# Patient Record
Sex: MALE | Race: WHITE | NOT HISPANIC OR LATINO | Employment: OTHER | URBAN - METROPOLITAN AREA
[De-identification: names, ages, dates, MRNs, and addresses within clinical notes are randomized per-mention and may not be internally consistent; named-entity substitution may affect disease eponyms.]

---

## 2017-01-11 ENCOUNTER — TELEPHONE (OUTPATIENT)
Dept: SLEEP MEDICINE | Facility: CLINIC | Age: 82
End: 2017-01-11

## 2017-01-11 DIAGNOSIS — G47.9 SLEEP DISTURBANCE: ICD-10-CM

## 2017-01-11 RX ORDER — CLONAZEPAM 0.5 MG/1
TABLET ORAL
Qty: 60 TABLET | Refills: 5 | Status: SHIPPED | OUTPATIENT
Start: 2017-01-11 | End: 2017-07-03 | Stop reason: SDUPTHER

## 2017-01-11 NOTE — TELEPHONE ENCOUNTER
----- Message from Randi Ojeda sent at 1/10/2017  9:23 AM CST -----  Contact: TONIA CAREY [[id number]]  _x  1st Request  _  2nd Request  _  3rd Request    Please refill the medication(s) listed below. Please call the patient when the prescription(s) is ready for  at the phone number [phone].    Medication #1 clonazePAM (KLONOPIN) 0.5 MG tablet        Preferred Pharmacy:  [facility] [address] [address]  Phone: [phone] Fax: [phone]

## 2017-01-16 DIAGNOSIS — R00.1 BRADYCARDIA: ICD-10-CM

## 2017-01-17 RX ORDER — LEVOTHYROXINE SODIUM 100 UG/1
TABLET ORAL
Qty: 90 TABLET | Refills: 0 | OUTPATIENT
Start: 2017-01-17

## 2017-02-15 ENCOUNTER — TELEPHONE (OUTPATIENT)
Dept: INFECTIOUS DISEASES | Facility: CLINIC | Age: 82
End: 2017-02-15

## 2017-02-20 ENCOUNTER — OFFICE VISIT (OUTPATIENT)
Dept: INFECTIOUS DISEASES | Facility: CLINIC | Age: 82
End: 2017-02-20
Payer: MEDICARE

## 2017-02-20 VITALS
DIASTOLIC BLOOD PRESSURE: 56 MMHG | WEIGHT: 156.31 LBS | BODY MASS INDEX: 25.12 KG/M2 | TEMPERATURE: 98 F | HEART RATE: 61 BPM | HEIGHT: 66 IN | SYSTOLIC BLOOD PRESSURE: 145 MMHG

## 2017-02-20 DIAGNOSIS — R26.9 GAIT DISTURBANCE: ICD-10-CM

## 2017-02-20 PROCEDURE — 1157F ADVNC CARE PLAN IN RCRD: CPT | Mod: S$GLB,,, | Performed by: INTERNAL MEDICINE

## 2017-02-20 PROCEDURE — 1126F AMNT PAIN NOTED NONE PRSNT: CPT | Mod: S$GLB,,, | Performed by: INTERNAL MEDICINE

## 2017-02-20 PROCEDURE — 1159F MED LIST DOCD IN RCRD: CPT | Mod: S$GLB,,, | Performed by: INTERNAL MEDICINE

## 2017-02-20 PROCEDURE — 99214 OFFICE O/P EST MOD 30 MIN: CPT | Mod: S$GLB,,, | Performed by: INTERNAL MEDICINE

## 2017-02-20 PROCEDURE — 99999 PR PBB SHADOW E&M-EST. PATIENT-LVL III: CPT | Mod: PBBFAC,,, | Performed by: INTERNAL MEDICINE

## 2017-02-20 NOTE — PROGRESS NOTES
Subjective:      Patient ID: Chivo Hawkins is a 88 y.o. male.    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 No vertigo or spinning but his balance was very poor. These sx have persisted since that time but are a little less pronounced than they were the morning of onset. Denies leg weakness or any new problems with vision or hearing.    Review of Systems   Constitution: Negative for chills, decreased appetite, fever, weakness, malaise/fatigue, night sweats, weight gain and weight loss.   HENT: Negative for congestion, ear pain, headaches, hearing loss, hoarse voice, sore throat and tinnitus.    Eyes: Negative for blurred vision, redness and visual disturbance.   Cardiovascular: Negative for chest pain, leg swelling and palpitations.   Respiratory: Negative for cough, hemoptysis, shortness of breath and sputum production.    Hematologic/Lymphatic: Negative for adenopathy. Does not bruise/bleed easily.   Skin: Negative for dry skin, itching, rash and suspicious lesions.   Musculoskeletal: Negative for back pain, joint pain, myalgias and neck pain.   Gastrointestinal: Negative for abdominal pain, constipation, diarrhea, heartburn, nausea and vomiting.   Genitourinary: Negative for dysuria, flank pain, frequency, hematuria, hesitancy and urgency.   Neurological: Negative for dizziness, numbness and paresthesias.   Psychiatric/Behavioral: Negative for depression and memory loss. The patient does not have insomnia and is not nervous/anxious.      Objective:   Physical Exam   Constitutional: He is oriented to person, place, and time. He appears well-developed and well-nourished.   Neurological: He is alert and oriented to person, place, and time.   In walking in exam room, some detectable balance issues noted, especially when he turns around    Vitals reviewed.    Assessment:       1. Gait disturbance , acute of 6 days duration; no vertigo         Plan:        neurology consultation (appt with Dr. Coffey tomorrow)

## 2017-02-20 NOTE — MR AVS SNAPSHOT
[facility] - Infectious Diseases  [address]  [facility] [address]  Phone: [phone]  Fax: [phone]                  Chivo Hawkins   2017 2:00 PM   Office Visit    Description:  Male : 7/10/1928   Provider:  Mandeep Bo MD   Department:  [facility] - Infectious Diseases           Reason for Visit     Annual Exam                To Do List           Future Appointments        Provider Department Dept Phone    2017 9:40 AM Otf Coffey MD [place] - Neurology [phone]      Goals (5 Years of Data)     None      [facility]     [facility] -  Assistance  Registered nurses in the [facility] Center provide clinical advisement, health education, appointment booking, and other advisory services.  Call for this free service at [phone].             Medications                Verify that the below list of medications is an accurate representation of the medications you are currently taking.  If none reported, the list may be blank. If incorrect, please contact your healthcare provider. Carry this list with you in case of emergency.           Current Medications     clonazePAM (KLONOPIN) 0.5 MG tablet TAKE 1 1/2 TO 2 TABLETS BY MOUTH EVERY EVENING IF NEEDED FOR SLEEP    diphenhydrAMINE (BENADRYL) 25 mg capsule Take 25 mg by mouth nightly as needed for Insomnia.    fish oil-omega-3 fatty acids 300-1,000 mg capsule Take 2 g by mouth once daily.      levothyroxine (SYNTHROID) 100 MCG tablet Take 1 tablet (100 mcg total) by mouth every morning.    MULTI-VITAMIN HI-PO ORAL Take by mouth.      senna (SENOKOT) 8.6 mg tablet Take 1 tablet by mouth once daily.      tamsulosin (FLOMAX) 0.4 mg Cp24 TAKE 1 CAPSULE BY MOUTH EVERY DAY    salicylic acid 6 % Sham Apply 1 application topically once daily.            Clinical Reference Information           Your Vitals Were     BP Pulse Temp Height Weight BMI    145/56 (BP Location: Left arm, Patient  "Position: Sitting) 61 98.1 °F (36.7 °C) (Oral) 5' 6" (1.676 m) 70.9 kg (156 lb 4.9 oz) 25.23 kg/m2      Blood Pressure          Most Recent Value    BP  (!)  145/56      Allergies as of 2/20/2017     Pcn [Penicillins]      Immunizations Administered on Date of Encounter - 2/20/2017     None      Language Assistance Services     ATTENTION: Language assistance services are available, free of charge. Please call [phone].      ATENCIÓN: Si habla español, tiene a davis disposición servicios gratuitos de asistencia lingüística. Llame al [phone].     ROCK Ý: N?u b?n nói Ti?ng Vi?t, có các d?ch v? h? tr? ngôn ng? mi?n phí dành cho b?n. G?i s? [phone].         [facility] - [facility] complies with applicable Federal civil rights laws and does not discriminate on the basis of race, color, national origin, age, disability, or sex.        "

## 2017-02-21 ENCOUNTER — LAB VISIT (OUTPATIENT)
Dept: LAB | Facility: HOSPITAL | Age: 82
End: 2017-02-21
Attending: NEUROMUSCULOSKELETAL MEDICINE & OMM
Payer: MEDICARE

## 2017-02-21 ENCOUNTER — OFFICE VISIT (OUTPATIENT)
Dept: NEUROLOGY | Facility: CLINIC | Age: 82
End: 2017-02-21
Payer: MEDICARE

## 2017-02-21 ENCOUNTER — TELEPHONE (OUTPATIENT)
Dept: NEUROLOGY | Facility: CLINIC | Age: 82
End: 2017-02-21

## 2017-02-21 ENCOUNTER — CLINICAL SUPPORT (OUTPATIENT)
Dept: CARDIOLOGY | Facility: CLINIC | Age: 82
End: 2017-02-21
Payer: MEDICARE

## 2017-02-21 VITALS — HEIGHT: 66 IN | BODY MASS INDEX: 24.8 KG/M2 | WEIGHT: 154.31 LBS

## 2017-02-21 DIAGNOSIS — I35.9 NONRHEUMATIC AORTIC VALVE DISORDER: ICD-10-CM

## 2017-02-21 DIAGNOSIS — R26.9 GAIT DISORDER: ICD-10-CM

## 2017-02-21 DIAGNOSIS — H53.461 HOMONYMOUS BILATERAL FIELD DEFECTS OF RIGHT SIDE: ICD-10-CM

## 2017-02-21 DIAGNOSIS — I63.9 CEREBROVASCULAR ACCIDENT (CVA), UNSPECIFIED MECHANISM: ICD-10-CM

## 2017-02-21 DIAGNOSIS — G81.91 RIGHT HEMIPARESIS: ICD-10-CM

## 2017-02-21 DIAGNOSIS — R26.9 GAIT DISORDER: Primary | ICD-10-CM

## 2017-02-21 LAB
AORTIC VALVE REGURGITATION: ABNORMAL
DIASTOLIC DYSFUNCTION: NO
ERYTHROCYTE [SEDIMENTATION RATE] IN BLOOD BY WESTERGREN METHOD: 5 MM/HR
ESTIMATED PA SYSTOLIC PRESSURE: 26.04
MITRAL VALVE MOBILITY: NORMAL
RETIRED EF AND QEF - SEE NOTES: 60 (ref 55–65)

## 2017-02-21 PROCEDURE — 1126F AMNT PAIN NOTED NONE PRSNT: CPT | Mod: S$GLB,,, | Performed by: NEUROMUSCULOSKELETAL MEDICINE & OMM

## 2017-02-21 PROCEDURE — 93306 TTE W/DOPPLER COMPLETE: CPT | Mod: S$GLB,,, | Performed by: INTERNAL MEDICINE

## 2017-02-21 PROCEDURE — 99214 OFFICE O/P EST MOD 30 MIN: CPT | Mod: S$GLB,,, | Performed by: NEUROMUSCULOSKELETAL MEDICINE & OMM

## 2017-02-21 PROCEDURE — 1159F MED LIST DOCD IN RCRD: CPT | Mod: S$GLB,,, | Performed by: NEUROMUSCULOSKELETAL MEDICINE & OMM

## 2017-02-21 PROCEDURE — 99999 PR PBB SHADOW E&M-EST. PATIENT-LVL III: CPT | Mod: PBBFAC,,, | Performed by: NEUROMUSCULOSKELETAL MEDICINE & OMM

## 2017-02-21 PROCEDURE — 1160F RVW MEDS BY RX/DR IN RCRD: CPT | Mod: S$GLB,,, | Performed by: NEUROMUSCULOSKELETAL MEDICINE & OMM

## 2017-02-21 PROCEDURE — 1157F ADVNC CARE PLAN IN RCRD: CPT | Mod: S$GLB,,, | Performed by: NEUROMUSCULOSKELETAL MEDICINE & OMM

## 2017-02-21 NOTE — LETTER
February 21, 2017      Mandeep Bo MD  [address]  [place] [address]           [place] - Neurology  [address] [facility]  [place] [address]  Phone: [phone]          Patient: Chivo Hawkins   MR Number: [id number]   YOB: 1928   Date of Visit: 2/21/2017       Dear Dr. Mandeep Bo:    Thank you for referring Chivo Hawkins to me for evaluation. Attached you will find relevant portions of my assessment and plan of care.    If you have questions, please do not hesitate to call me. I look forward to following Chivo Hawkins along with you.    Sincerely,    Otf Coffey MD    Enclosure  CC:  No Recipients    If you would like to receive this communication electronically, please contact [email or web address] or [phone] to request more information on [employer] access.    For providers and/or their staff who would like to refer a patient to [facility], please contact us through our one-stop-shop provider referral line, [facility], at [phone].    If you feel you have received this communication in error or would no longer like to receive these types of communications, please e-mail [email or web address]

## 2017-02-21 NOTE — PROGRESS NOTES
Chivo Hawkins  7/10/1928  Review of patient's allergies indicates:   Allergen Reactions    Pcn [penicillins] Rash     [unfilled]    Past Medical History   Diagnosis Date    Arthritis     Cancer     Cataract      Social History     Social History    Marital status:      Spouse name: N/A    Number of children: N/A    Years of education: N/A     Occupational History    Not on file.     Social History Main Topics    Smoking status: Former Smoker     Packs/day: 1.00     Years: 15.00     Types: Cigarettes    Smokeless tobacco: Never Used    Alcohol use 4.2 oz/week     7 Glasses of wine per week    Drug use: No    Sexual activity: Not Currently     Other Topics Concern    Not on file     Social History Narrative     No family history on file.    Review of systems:  Constitutional-negative  Eyes-negative  ENT, mouth-negative  Cardiovascular-negative  Respiratory-negative  GI-negative  - negative  Musculoskeletal-negative  Skin-negative  Neurologic-negative  Psychiatric-negative  Endocrine-negative  Hematology/lymph nodes-negative  Allergies/immunology-negative  Chivo Hawkins  7/10/1928  Review of patient's allergies indicates:   Allergen Reactions    Pcn [penicillins] Rash     [unfilled]    Past Medical History   Diagnosis Date    Arthritis     Cancer     Cataract      Social History     Social History    Marital status:      Spouse name: N/A    Number of children: N/A    Years of education: N/A     Occupational History    Not on file.     Social History Main Topics    Smoking status: Former Smoker     Packs/day: 1.00     Years: 15.00     Types: Cigarettes    Smokeless tobacco: Never Used    Alcohol use 4.2 oz/week     7 Glasses of wine per week    Drug use: No    Sexual activity: Not Currently     Other Topics Concern    Not on file     Social History Narrative     No family history on file.    Review of systems:  Constitutional-negative  Eyes-negative  ENT,  mouth-negative  Cardiovascular-negative  Respiratory-negative  GI-negative  - negative  Musculoskeletal-negative  Skin-negative  Neurologic-negative  Psychiatric-negative  Endocrine-negative  Hematology/lymph nodes-negative  Allergies/immunology-negative  Gen. 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  He had severe balance problems which is gotten somewhat better since that period time.  Patient denies any specific weakness, visual problems, diplopia, or sensory loss.  He has no known risk factors for stroke (high blood pressure, lipids, or cardiac disease other than bradycardia.      Neurological Exam:  Mental status-alert and oriented to person, place, and time; attention span and concentration is good. Fund of knowledge-patient is aware of current events and able to give detailed history of the current problem.recent and remote memory seems intact. Language function is normal with no evidence of aphasia  Cranial nerves:Visual acuity to hand chart -normal; visual fields to confrontation shows a partial right homonymous hemianopsia;;pupils were equal and reactive to light ;no evidence of ptosis ;  funduscopic examination was normal with sharp disc margins. external ocular movements were full with no nystagmus. Facial sensation to pinprick : normal ; corneal reflexes intact; Facial muscles were symmetrical. Hearing is unimpaired symmetrical finger rub; Tongue movements - normal ; palate movements - normal ;Swallowing unimpaired. Shoulder shrug was intact with good strength Speech was normal  Motor examination: Upper : 4/5  weakness on extensors on the right arm at elbow                                    Lower extremities - mild 4/5 proximal weakness; muscle tone was normal ;                  Right-handed  Sensory examination:   Upper; normal pinprick and soft touch ;   Lower extremities - normal and symmetrical.   Vibration sense: 10-15 seconds @ toes  Deep tendon reflexes: upper extremities : 1+ symmetrical ;     lower extremities KJ- 1 1 +; AJ - 0 Both plantar responses were flexor  Cerebellar examination upper: Normal finger to nose and rapid alternating movements  Gait: Unsteady with a tendency to fall to the right with suggestion of a limp on the right side;    Romberg test: negative         Tandem gait: Unsteady  Involuntary movements: Negative  TMJ - no tenderness  Cervical examination: Full range of motion with no pain Cervical tenderness :negative  Lumbar examination: Low back tenderness-negative                  Sciatic notchtenderness-negative            Straight leg raising : negative    Impression: Gait disorder; right hemiparesis; partial right visual field deficit; rule out CVA left middle cerebral artery    Recommendations/Plan : MRI brain without contrast; carotid Doppler studies: Echocardiogram; blood work ordered; patient told to start baby aspirin on a daily basis for platelet.;  follow-up 1 month

## 2017-02-22 LAB
ANA SER QL IF: POSITIVE
ANA TITR SER IF: NORMAL {TITER}

## 2017-02-23 LAB
ANTI SM ANTIBODY: 0.96 EU
ANTI SM/RNP ANTIBODY: 0.97 EU
ANTI-SM INTERPRETATION: NEGATIVE
ANTI-SM/RNP INTERPRETATION: NEGATIVE
ANTI-SSA ANTIBODY: 1.02 EU
ANTI-SSA INTERPRETATION: NEGATIVE
ANTI-SSB ANTIBODY: 15.38 EU
ANTI-SSB INTERPRETATION: NEGATIVE
DSDNA AB SER-ACNC: NORMAL [IU]/ML

## 2017-02-24 LAB
CARDIOLIPIN IGG SER IA-ACNC: <9.4 GPL
CARDIOLIPIN IGM SER IA-ACNC: <9.4 MPL

## 2017-02-27 ENCOUNTER — TELEPHONE (OUTPATIENT)
Dept: NEUROLOGY | Facility: CLINIC | Age: 82
End: 2017-02-27

## 2017-02-27 NOTE — TELEPHONE ENCOUNTER
I called this patient back to discuss his results and to tell him to f / u with his PCP but there was no answer or a  Voicemail to leave a  Message

## 2017-02-27 NOTE — TELEPHONE ENCOUNTER
----- Message from Michael Patino sent at 2/22/2017  4:32 PM CST -----  Contact: PT  Returning call from yesterday,  [phone]

## 2017-03-02 ENCOUNTER — HOSPITAL ENCOUNTER (OUTPATIENT)
Dept: RADIOLOGY | Facility: OTHER | Age: 82
Discharge: HOME | End: 2017-03-02
Attending: NEUROMUSCULOSKELETAL MEDICINE & OMM
Payer: MEDICARE

## 2017-03-02 DIAGNOSIS — R26.9 GAIT DISORDER: ICD-10-CM

## 2017-03-02 PROCEDURE — 70551 MRI BRAIN STEM W/O DYE: CPT | Mod: TC

## 2017-03-02 PROCEDURE — 70551 MRI BRAIN STEM W/O DYE: CPT | Mod: 26,,, | Performed by: RADIOLOGY

## 2017-03-06 ENCOUNTER — OFFICE VISIT (OUTPATIENT)
Dept: INFECTIOUS DISEASES | Facility: CLINIC | Age: 82
End: 2017-03-06
Payer: MEDICARE

## 2017-03-06 VITALS
HEART RATE: 51 BPM | BODY MASS INDEX: 25.01 KG/M2 | SYSTOLIC BLOOD PRESSURE: 148 MMHG | TEMPERATURE: 98 F | HEIGHT: 66 IN | WEIGHT: 155.63 LBS | DIASTOLIC BLOOD PRESSURE: 56 MMHG

## 2017-03-06 DIAGNOSIS — H61.20 CERUMEN DEBRIS ON TYMPANIC MEMBRANE, UNSPECIFIED LATERALITY: ICD-10-CM

## 2017-03-06 DIAGNOSIS — I67.81 ACUTE CEREBROVASCULAR INSUFFICIENCY: ICD-10-CM

## 2017-03-06 DIAGNOSIS — I63.412 CEREBROVASCULAR ACCIDENT (CVA) DUE TO EMBOLISM OF LEFT MIDDLE CEREBRAL ARTERY: ICD-10-CM

## 2017-03-06 DIAGNOSIS — I63.9 CEREBROVASCULAR ACCIDENT (CVA), UNSPECIFIED MECHANISM: Primary | ICD-10-CM

## 2017-03-06 PROCEDURE — 99999 PR PBB SHADOW E&M-EST. PATIENT-LVL III: CPT | Mod: PBBFAC,,, | Performed by: INTERNAL MEDICINE

## 2017-03-06 PROCEDURE — 1126F AMNT PAIN NOTED NONE PRSNT: CPT | Mod: S$GLB,,, | Performed by: INTERNAL MEDICINE

## 2017-03-06 PROCEDURE — 99214 OFFICE O/P EST MOD 30 MIN: CPT | Mod: S$GLB,,, | Performed by: INTERNAL MEDICINE

## 2017-03-06 PROCEDURE — 1159F MED LIST DOCD IN RCRD: CPT | Mod: S$GLB,,, | Performed by: INTERNAL MEDICINE

## 2017-03-06 PROCEDURE — 1160F RVW MEDS BY RX/DR IN RCRD: CPT | Mod: S$GLB,,, | Performed by: INTERNAL MEDICINE

## 2017-03-06 PROCEDURE — 1157F ADVNC CARE PLAN IN RCRD: CPT | Mod: S$GLB,,, | Performed by: INTERNAL MEDICINE

## 2017-03-06 RX ORDER — NAPROXEN SODIUM 220 MG/1
81 TABLET, FILM COATED ORAL DAILY
COMMUNITY

## 2017-03-06 NOTE — PROGRESS NOTES
Subjective:      Patient ID: Chivo Hawkins is a 88 y.o. male.    Chief Complaint:Follow-up      History of Present Illness    Pt has seen Dr. Ramon Coffey who feels that he had a stoke in left middle cerebral artery distribution. He asked for a carotid U/S which I have ordered and placed him on ASA daily. The pt feels that his gait has improved in the short time since this event.    Review of Systems   Constitution: Negative for chills, decreased appetite, fever, weakness, malaise/fatigue, night sweats, weight gain and weight loss.   HENT: Positive for hearing loss. Negative for congestion, ear pain, headaches, hoarse voice, sore throat and tinnitus.    Eyes: Negative for blurred vision, redness and visual disturbance.   Cardiovascular: Negative for chest pain, leg swelling and palpitations.   Respiratory: Negative for cough, hemoptysis, shortness of breath and sputum production.    Hematologic/Lymphatic: Negative for adenopathy. Does not bruise/bleed easily.   Skin: Negative for dry skin, itching, rash and suspicious lesions.   Musculoskeletal: Negative for back pain, joint pain, myalgias and neck pain.   Gastrointestinal: Negative for abdominal pain, constipation, diarrhea, heartburn, nausea and vomiting.   Genitourinary: Negative for dysuria, flank pain, frequency, hematuria, hesitancy and urgency.   Neurological: Positive for dizziness. Negative for numbness and paresthesias.   Psychiatric/Behavioral: Negative for depression and memory loss. The patient does not have insomnia and is not nervous/anxious.      Objective:   Physical Exam   Constitutional: He is oriented to person, place, and time. He appears well-developed and well-nourished.   Neurological: He is alert and oriented to person, place, and time.   Psychiatric: He has a normal mood and affect. His behavior is normal. Thought content normal.   Vitals reviewed.    Assessment:       1. Acute cerebrovascular insufficiency     2. Cerumen debris on  tympanic membrane, unspecified laterality    3. Cerebrovascular accident (CVA) due to embolism of left middle cerebral artery          Plan:        1. Carotid U/S   2. ENT appt at pts request

## 2017-03-06 NOTE — MR AVS SNAPSHOT
[facility] - Infectious Diseases  [address]  [facility] [address]  Phone: [phone]  Fax: [phone]                  Chivo Hawkins   3/6/2017 2:30 PM   Office Visit    Description:  Male : 7/10/1928   Provider:  Mandeep Bo MD   Department:  [facility] - Infectious Diseases           Reason for Visit     Follow-up           Diagnoses this Visit        Comments    Cerebrovascular accident (CVA), unspecified mechanism    -  Primary     Acute cerebrovascular insufficiency         Cerumen debris on tympanic membrane, unspecified laterality                To Do List           Future Appointments        Provider Department Dept Phone    3/8/2017 11:00 AM VASCULAR, CARDIOLOGY [facility] Vascular Cardiology [phone]    2017 10:00 AM AUDIOGRAM, AUDIO [facility] Audiology [phone]    2017 11:00 AM Frankie Liu III, MD [facility] Otorhinolaryngology [phone]      Goals (5 Years of Data)     None      [facility]     [facility] On Call Nurse [facility] -  Assistance  Registered nurses in the [facility] On Call Center provide clinical advisement, health education, appointment booking, and other advisory services.  Call for this free service at [phone].             Medications                Verify that the below list of medications is an accurate representation of the medications you are currently taking.  If none reported, the list may be blank. If incorrect, please contact your healthcare provider. Carry this list with you in case of emergency.           Current Medications     aspirin 81 MG Chew Take 81 mg by mouth once daily.    clonazePAM (KLONOPIN) 0.5 MG tablet TAKE 1 1/2 TO 2 TABLETS BY MOUTH EVERY EVENING IF NEEDED FOR SLEEP    diphenhydrAMINE (BENADRYL) 25 mg capsule Take 25 mg by mouth nightly as needed for Insomnia.    fish oil-omega-3 fatty acids 300-1,000 mg capsule Take 2 g by mouth once daily.      levothyroxine (SYNTHROID) 100 MCG tablet  "Take 1 tablet (100 mcg total) by mouth every morning.    MULTI-VITAMIN HI-PO ORAL Take by mouth.      salicylic acid 6 % Sham Apply 1 application topically once daily.     tamsulosin (FLOMAX) 0.4 mg Cp24 TAKE 1 CAPSULE BY MOUTH EVERY DAY           Clinical Reference Information           Your Vitals Were     BP Pulse Temp Height Weight BMI    148/56 (BP Location: Left arm, Patient Position: Sitting) 51 97.7 °F (36.5 °C) (Oral) 5' 6" (1.676 m) 70.6 kg (155 lb 10.3 oz) 25.12 kg/m2      Blood Pressure          Most Recent Value    BP  (!)  148/56      Allergies as of 3/6/2017     Pcn [Penicillins]      Immunizations Administered on Date of Encounter - 3/6/2017     None      Orders Placed During Today's Visit      Normal Orders This Visit    Ambulatory referral to ENT     Future Labs/Procedures Expected by Expires    CAR Ultrasound doppler carotid bliateral  As directed 3/6/2018      Language Assistance Services     ATTENTION: Language assistance services are available, free of charge. Please call [phone].      ATENCIÓN: Si habla español, tiene a davis disposición servicios gratuitos de asistencia lingüística. Llame al [phone].     ROCK Ý: N?u b?n nói Ti?ng Vi?t, có các d?ch v? h? tr? ngôn ng? mi?n phí dành cho b?n. G?i s? [phone].         [facility] complies with applicable Federal civil rights laws and does not discriminate on the basis of race, color, national origin, age, disability, or sex.        "

## 2017-03-08 ENCOUNTER — CLINICAL SUPPORT (OUTPATIENT)
Dept: CARDIOLOGY | Facility: CLINIC | Age: 82
End: 2017-03-08
Payer: MEDICARE

## 2017-03-08 DIAGNOSIS — I67.81 ACUTE CEREBROVASCULAR INSUFFICIENCY: ICD-10-CM

## 2017-03-08 DIAGNOSIS — I63.9 CEREBROVASCULAR ACCIDENT (CVA), UNSPECIFIED MECHANISM: ICD-10-CM

## 2017-03-08 LAB — INTERNAL CAROTID STENOSIS: NORMAL

## 2017-03-08 PROCEDURE — 93880 EXTRACRANIAL BILAT STUDY: CPT | Mod: S$GLB,,, | Performed by: INTERNAL MEDICINE

## 2017-03-14 ENCOUNTER — OFFICE VISIT (OUTPATIENT)
Dept: OTOLARYNGOLOGY | Facility: CLINIC | Age: 82
End: 2017-03-14
Payer: MEDICARE

## 2017-03-14 VITALS — DIASTOLIC BLOOD PRESSURE: 58 MMHG | HEART RATE: 59 BPM | SYSTOLIC BLOOD PRESSURE: 159 MMHG | TEMPERATURE: 97 F

## 2017-03-14 DIAGNOSIS — H61.22 IMPACTED CERUMEN OF LEFT EAR: Primary | ICD-10-CM

## 2017-03-14 PROCEDURE — 99499 UNLISTED E&M SERVICE: CPT | Mod: S$GLB,,, | Performed by: OTOLARYNGOLOGY

## 2017-03-14 PROCEDURE — 99999 PR PBB SHADOW E&M-EST. PATIENT-LVL II: CPT | Mod: PBBFAC,,, | Performed by: OTOLARYNGOLOGY

## 2017-03-14 NOTE — PROGRESS NOTES
Subjective:       Patient ID: Chivo Hawkins is a 88 y.o. male.    Chief Complaint: No chief complaint on file.    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  He was recently examined by his friend Chivo John who suggested he have his ears cleaned prior to performance of an audiometric study in reference to his hearing acumen.  He readily admits to trying to flush wax from both ear canals with an irrigation kit.    He was apparently succssfull in extracting  wax from the right ear canal he believes.  He is not sure so sure about the left ear.  He denies use of anticoagulant medication    PMH: Stroke, prostate cancer treated with x-ray; hearing loss  PSH: Tonsillectomy  ROS questionnaire not completed  Review of Systems     Other:  Negative for rash.         Objective:     Blood pressure 159/58 pulse 59 temperature 97.0  Gen.: Alert and oriented gentleman in no acute distress  Physical Exam   Constitutional: He is oriented to person, place, and time. He appears well-developed and well-nourished.   HENT:   Head: Normocephalic.   Right Ear: Hearing, tympanic membrane and ear canal normal. No drainage. No foreign bodies. No mastoid tenderness. Tympanic membrane is not perforated. No decreased hearing is noted.   Left Ear: Hearing, tympanic membrane and ear canal normal. No drainage. No foreign bodies. No mastoid tenderness. Tympanic membrane is not perforated. No decreased hearing is noted.   Ears:    Nose: No nose lacerations, nasal deformity, septal deviation or nasal septal hematoma. No epistaxis. Right sinus exhibits no maxillary sinus tenderness and no frontal sinus tenderness. Left sinus exhibits no maxillary sinus tenderness and no frontal sinus tenderness.   Mouth/Throat: Uvula is midline, oropharynx is clear and moist and mucous membranes are normal. He does not have dentures. No oral lesions. No trismus in the jaw. No uvula swelling or dental  caries. No oropharyngeal exudate or tonsillar abscesses.   Neck: No thyromegaly present.   Pulmonary/Chest: Effort normal. No stridor.   Lymphadenopathy:     He has no cervical adenopathy.   Neurological: He is alert and oriented to person, place, and time.   Skin: No rash noted.   Psychiatric: His behavior is normal.       Assessment:       1. Impacted cerumen of left ear        Plan:     No evidence of cerumen impaction obstructive of either canal  RTC prn

## 2017-04-18 DIAGNOSIS — R00.1 BRADYCARDIA: ICD-10-CM

## 2017-04-19 RX ORDER — LEVOTHYROXINE SODIUM 100 UG/1
TABLET ORAL
Qty: 90 TABLET | Refills: 0 | OUTPATIENT
Start: 2017-04-19

## 2017-04-22 DIAGNOSIS — R00.1 BRADYCARDIA: ICD-10-CM

## 2017-04-23 RX ORDER — LEVOTHYROXINE SODIUM 100 UG/1
TABLET ORAL
Qty: 90 TABLET | Refills: 0 | OUTPATIENT
Start: 2017-04-23

## 2017-07-03 ENCOUNTER — TELEPHONE (OUTPATIENT)
Dept: INFECTIOUS DISEASES | Facility: CLINIC | Age: 82
End: 2017-07-03

## 2017-07-03 ENCOUNTER — OFFICE VISIT (OUTPATIENT)
Dept: SLEEP MEDICINE | Facility: CLINIC | Age: 82
End: 2017-07-03
Payer: MEDICARE

## 2017-07-03 VITALS
WEIGHT: 153 LBS | SYSTOLIC BLOOD PRESSURE: 149 MMHG | HEART RATE: 48 BPM | DIASTOLIC BLOOD PRESSURE: 57 MMHG | HEIGHT: 66 IN | BODY MASS INDEX: 24.59 KG/M2

## 2017-07-03 DIAGNOSIS — G47.9 SLEEP DISTURBANCE: ICD-10-CM

## 2017-07-03 DIAGNOSIS — G47.33 OSA (OBSTRUCTIVE SLEEP APNEA): Primary | ICD-10-CM

## 2017-07-03 DIAGNOSIS — R00.1 BRADYCARDIA: ICD-10-CM

## 2017-07-03 PROCEDURE — 1159F MED LIST DOCD IN RCRD: CPT | Mod: S$GLB,,, | Performed by: PSYCHIATRY & NEUROLOGY

## 2017-07-03 PROCEDURE — 99999 PR PBB SHADOW E&M-EST. PATIENT-LVL II: CPT | Mod: PBBFAC,,, | Performed by: PSYCHIATRY & NEUROLOGY

## 2017-07-03 PROCEDURE — 99214 OFFICE O/P EST MOD 30 MIN: CPT | Mod: S$GLB,,, | Performed by: PSYCHIATRY & NEUROLOGY

## 2017-07-03 PROCEDURE — 1126F AMNT PAIN NOTED NONE PRSNT: CPT | Mod: S$GLB,,, | Performed by: PSYCHIATRY & NEUROLOGY

## 2017-07-03 RX ORDER — TALC
POWDER (GRAM) TOPICAL
COMMUNITY
Start: 2017-06-07 | End: 2018-07-03

## 2017-07-03 RX ORDER — LEVOTHYROXINE SODIUM 100 UG/1
100 TABLET ORAL EVERY MORNING
Qty: 90 TABLET | Refills: 4 | Status: SHIPPED | OUTPATIENT
Start: 2017-07-03 | End: 2018-08-08 | Stop reason: SDUPTHER

## 2017-07-03 RX ORDER — CLONAZEPAM 0.5 MG/1
TABLET ORAL
Qty: 60 TABLET | Refills: 5 | Status: SHIPPED | OUTPATIENT
Start: 2017-07-03 | End: 2017-07-31 | Stop reason: SDUPTHER

## 2017-07-03 RX ORDER — RIVASTIGMINE TARTRATE 3 MG/1
CAPSULE ORAL
COMMUNITY
Start: 2017-05-31 | End: 2017-11-06

## 2017-07-03 NOTE — PROGRESS NOTES
This 88 y.o. male patient returns for the management of obstructive sleep apnea and sleep disruption.    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 Sleep quality much improved with combination of clonazepam and Benadryl. No side effects or daytime sedation with current dose. No concerns with balance at night during bathroom trips. Usually wakes up to urinate 1x. No wearing off effect. Feels rested upon awakening. No drug effect    Reports regular exercise 3 to 4x/week of aerobics and strength training and abstaining from caffeine late in the day has dramatically improved quality of sleep. He falls asleep and remains asleep with exception of occasional nocturia x1 without difficulty.    For DON, he continues to use TAP-3 oral appliance, which has been fully titrated and continues to result in symptomatic improvement. Made by Dr. Briggs. He does not have any report of snoring, gasping, or snorting when using it.    He has [employer] [facility] about a year ago. Feels he is doing well with it; he does not need to make any changes.     ESS = 4 (prior 5)    PRIOR SLEEP HISTORY:  Historically, he has seen Dr. Najera in the past for sleep problems. He had 2 sleep studies, showing PLMS and DON. He was treated with oral appliance and medication for limb movements.  He attempted CPAP, but he was unable to tolerate it.  He has been taking clonazepam 0.5 mg for years, then reduced to 1/2 tablet about a month ago.  Medications: He only takes clonazepam and Flomax 0.4 mg  Baseline PSG 7/30/08: 18 hypopneas, based on 155 minutes of sleep;   PSG 1/20/09: (with oral appliance) - 1 hypopnea, 125 limb movements (36% associated with arousals); LM index 23.3  He also complains of nasal congestion, which congests one side at a time.  Noted deviated  "septum  Patient reports caffeine (coffee with dinner) and tea at dinner.    SLEEP ROUTINE:   Bed partner: lives alone ( since 2011)   Time to bed: 10-11 pm   Sleep onset latency: 5 mins   Disruptions or awakenings: 1-3 times   Wakeup time: 5:30-7:15 am   Perceived sleep quality: 3-4 our of 5   Daytime naps: 0-1    Past Medical History:   Diagnosis Date    Arthritis     Cancer     Cataract        Past Surgical History:   Procedure Laterality Date    COLONOSCOPY  2012    EYE SURGERY      TONSILLECTOMY         History reviewed. No pertinent family history.    Social History   Substance Use Topics    Smoking status: Former Smoker     Packs/day: 1.00     Years: 15.00     Types: Cigarettes    Smokeless tobacco: Never Used    Alcohol use 4.2 oz/week     7 Glasses of wine per week   Retired pediatric cardiologist    ALLERGIES: Reviewed in EPIC    CURRENT MEDICATIONS: Reviewed in EPIC    REVIEW OF SYSTEMS:  Sleep related symptoms as per HPI;    Weight down 2 lbs from last visit   Mood disturbance ( x 6 years) much improved, especially after moving from primary residence to pen[facility]iary community housing.      PHYSICAL EXAM:     BP (!) 149/57 (BP Location: Right arm, Patient Position: Sitting)   Pulse (!) 48   Ht 5' 6" (1.676 m)   Wt 69.4 kg (153 lb)   BMI 24.69 kg/m²   GENERAL: Well groomed; Normally developed      ASSESSMENT:    1. Obstructive Sleep Apnea, mild, based on prior AHI on sleep study. Treated with oral appliance (TAP-3 device) by Dr. Briggs. A follow up sleep study demonstrated resolution of mild DON with the device. He continues to use it without difficulty or complaint.    2. Sleep disturbances NEC - treated successfully in the past with clonazepam 0.5 mg. This could be masking normal physiologic awakenings seen with aging. Lowering dose to 0.25 mg, may have resulted in more frequent awakenings (or awareness thereof).  Whether this sleep interruption is due to mild PLMS, mild DON, or a " facet of natural aging is unknown.  x6 years, so mood may be playing a role as well. Regardless, he has seemingly responded well to clonazepam in the past, and patient has responded well to modest increases of this to 0.75 mg. No side effects or daytime sedation.         PLAN:    Treatment:  1. Continue clonazepam to 0.75 to1.0 mg. If adverse effect (daytime sedation or cognitive complaints) or not tolerated, then consider low dose SSRI. He has been doing well on this for quite some time, so no change currently indicated.  2. Limit caffeine to 1st half of the day.  3. Continue use of nightly TAP-3 device  4. Stop Benadryl, if no utility. Patient started this on his own pre-clonazepam. May be able to discontinue without disruption of sleep, and would likely benefit from removing this from his regimen, especially given memory concerns and potential for anti-cholinergic side effects.  5. Patient states he is out of synthroid and no active prescriptions. Will message his PCP, Dr. Bo    Precautions: The patient was advised to abstain from driving should they feel sleepy or drowsy.    Follow up: 12 months. MD/NP

## 2017-07-03 NOTE — TELEPHONE ENCOUNTER
----- Message from Donovan Fernandez MD sent at 7/3/2017 11:48 AM CDT -----  Gabriel, Mr. Hawkins says he is out of his synthroid and I do not see an active prescription. Could you look in on this? I wasn't sure of his thyroid status. Seems like he should still be on this. Thanks, Frandy

## 2017-07-31 DIAGNOSIS — G47.9 SLEEP DISTURBANCE: ICD-10-CM

## 2017-08-01 RX ORDER — CLONAZEPAM 0.5 MG/1
TABLET ORAL
Qty: 60 TABLET | Refills: 5 | Status: SHIPPED | OUTPATIENT
Start: 2017-08-01 | End: 2017-11-16 | Stop reason: SDUPTHER

## 2017-09-13 ENCOUNTER — TELEPHONE (OUTPATIENT)
Dept: INFECTIOUS DISEASES | Facility: CLINIC | Age: 82
End: 2017-09-13

## 2017-09-13 DIAGNOSIS — I63.412 CEREBROVASCULAR ACCIDENT (CVA) DUE TO EMBOLISM OF LEFT MIDDLE CEREBRAL ARTERY: Primary | ICD-10-CM

## 2017-09-13 DIAGNOSIS — R41.3 MEMORY LOSS: ICD-10-CM

## 2017-09-13 DIAGNOSIS — E03.9 HYPOTHYROIDISM, UNSPECIFIED TYPE: Chronic | ICD-10-CM

## 2017-09-13 NOTE — TELEPHONE ENCOUNTER
----- Message from Thuy Brown MA sent at 9/13/2017  2:58 PM CDT -----  Contact: Chivo  tel:  [phone]       ----- Message -----  From: Molly Case  Sent: 9/13/2017   2:24 PM  To: ILANA Peralta's pt./   Pt. Will see you on 11/6 at 10:30am.     Did you want him to have any labs or testing before he sees you?  If so, pls call him.

## 2017-10-30 RX ORDER — TAMSULOSIN HYDROCHLORIDE 0.4 MG/1
CAPSULE ORAL
Qty: 90 CAPSULE | Refills: 4 | Status: SHIPPED | OUTPATIENT
Start: 2017-10-30 | End: 2019-01-14 | Stop reason: SDUPTHER

## 2017-11-06 ENCOUNTER — OFFICE VISIT (OUTPATIENT)
Dept: INFECTIOUS DISEASES | Facility: CLINIC | Age: 82
End: 2017-11-06
Payer: MEDICARE

## 2017-11-06 VITALS
HEART RATE: 53 BPM | DIASTOLIC BLOOD PRESSURE: 46 MMHG | TEMPERATURE: 98 F | WEIGHT: 152.56 LBS | SYSTOLIC BLOOD PRESSURE: 138 MMHG | BODY MASS INDEX: 24.52 KG/M2 | HEIGHT: 66 IN

## 2017-11-06 DIAGNOSIS — G47.33 OSA (OBSTRUCTIVE SLEEP APNEA): ICD-10-CM

## 2017-11-06 DIAGNOSIS — E03.9 HYPOTHYROIDISM, UNSPECIFIED TYPE: Primary | ICD-10-CM

## 2017-11-06 DIAGNOSIS — C61 PROSTATE CANCER: Chronic | ICD-10-CM

## 2017-11-06 DIAGNOSIS — G47.9 SLEEP DISTURBANCES: ICD-10-CM

## 2017-11-06 DIAGNOSIS — I63.412 CEREBROVASCULAR ACCIDENT (CVA) DUE TO EMBOLISM OF LEFT MIDDLE CEREBRAL ARTERY: ICD-10-CM

## 2017-11-06 PROCEDURE — 99215 OFFICE O/P EST HI 40 MIN: CPT | Mod: S$GLB,,, | Performed by: INTERNAL MEDICINE

## 2017-11-06 PROCEDURE — 99999 PR PBB SHADOW E&M-EST. PATIENT-LVL III: CPT | Mod: PBBFAC,,, | Performed by: INTERNAL MEDICINE

## 2017-11-06 NOTE — PROGRESS NOTES
Subjective:      Patient ID: Chivo Hawkins is a 89 y.o. male.    Chief Complaint:Annual Exam      History of Present Illness    Hypothyroidism   Pt said he was dx with hypothyroidism many yrs ago and has been thyroxine 100 mcg daily. TSH pending..     BPH (benign prostatic hypertrophy)   Pt on flomax with minimal symptoms presently. Nocturia x1      Prostate cancer   He was dx with prostate ca in 2001 and treated with IMRT. He is followed by Dr. Rucker.     Retina degeneration   Has significant vision loss. Followed for retinal disease by Dr. Emiliano Barrera.     DON  He was seen by sleep med for rx of his DON. He is on klonezapam, benadryl and melatonin hs and uses mouth appliance for rx of this.       Review of Systems   Constitution: Negative for chills, decreased appetite, fever, weakness, malaise/fatigue, night sweats, weight gain and weight loss.   HENT: Negative for congestion, ear pain, hearing loss, hoarse voice, sore throat and tinnitus.    Eyes: Negative for blurred vision, redness and visual disturbance.   Cardiovascular: Negative for chest pain, leg swelling and palpitations.   Respiratory: Negative for cough, hemoptysis, shortness of breath and sputum production.    Hematologic/Lymphatic: Negative for adenopathy. Does not bruise/bleed easily.   Skin: Negative for dry skin, itching, rash and suspicious lesions.   Musculoskeletal: Negative for back pain, joint pain, myalgias and neck pain.   Gastrointestinal: Negative for abdominal pain, constipation, diarrhea, heartburn, nausea and vomiting.   Genitourinary: Negative for dysuria, flank pain, frequency, hematuria, hesitancy and urgency.   Neurological: Negative for dizziness, headaches, numbness and paresthesias.   Psychiatric/Behavioral: Negative for depression and memory loss. The patient does not have insomnia and is not nervous/anxious.      Objective:   Physical Exam   Constitutional: He is oriented to person, place, and time. He appears  well-developed and well-nourished.   HENT:   Head: Normocephalic and atraumatic.   Mouth/Throat: Oropharynx is clear and moist.   Eyes: Conjunctivae and EOM are normal. Pupils are equal, round, and reactive to light.   Neck: Normal range of motion. Neck supple. No thyromegaly present.   Cardiovascular: Normal rate, regular rhythm and normal heart sounds.    No murmur heard.  Pulmonary/Chest: Effort normal and breath sounds normal. He has no wheezes. He has no rales.   Abdominal: Soft. Bowel sounds are normal. He exhibits no mass. There is no tenderness. There is no rebound.   Musculoskeletal: Normal range of motion.   Lymphadenopathy:     He has no cervical adenopathy.   Neurological: He is alert and oriented to person, place, and time.   Skin: Skin is warm and dry.   Psychiatric: He has a normal mood and affect. His behavior is normal.   Vitals reviewed.    Assessment:       1. Hypothyroidism, unspecified type    2. Prostate cancer    3. Cerebrovascular accident (CVA) due to embolism of left middle cerebral artery    4. DON (obstructive sleep apnea)    5. Sleep disturbances          Plan:         1. Continue present medications. Labs ordered with phone review    2. Annual exam

## 2017-11-16 ENCOUNTER — OFFICE VISIT (OUTPATIENT)
Dept: SLEEP MEDICINE | Facility: CLINIC | Age: 82
End: 2017-11-16
Payer: MEDICARE

## 2017-11-16 VITALS
HEART RATE: 53 BPM | DIASTOLIC BLOOD PRESSURE: 57 MMHG | HEIGHT: 66 IN | WEIGHT: 155.44 LBS | BODY MASS INDEX: 24.98 KG/M2 | SYSTOLIC BLOOD PRESSURE: 148 MMHG

## 2017-11-16 DIAGNOSIS — R41.3 MEMORY LOSS: ICD-10-CM

## 2017-11-16 DIAGNOSIS — G47.33 OSA (OBSTRUCTIVE SLEEP APNEA): Primary | ICD-10-CM

## 2017-11-16 DIAGNOSIS — G47.9 SLEEP DISTURBANCE: ICD-10-CM

## 2017-11-16 DIAGNOSIS — G47.9 SLEEP DISTURBANCES: ICD-10-CM

## 2017-11-16 PROCEDURE — 99999 PR PBB SHADOW E&M-EST. PATIENT-LVL III: CPT | Mod: PBBFAC,,, | Performed by: PSYCHIATRY & NEUROLOGY

## 2017-11-16 PROCEDURE — 99214 OFFICE O/P EST MOD 30 MIN: CPT | Mod: S$GLB,,, | Performed by: PSYCHIATRY & NEUROLOGY

## 2017-11-16 RX ORDER — CLONAZEPAM 0.5 MG/1
TABLET ORAL
Qty: 30 TABLET | Refills: 5 | Status: SHIPPED | OUTPATIENT
Start: 2017-11-16 | End: 2018-04-30 | Stop reason: SDUPTHER

## 2017-11-16 NOTE — PROGRESS NOTES
This 89 y.o. male patient returns for the management of obstructive sleep apnea and sleep disruption.    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    1. 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 Sleep quality much improved with combination of clonazepam and Benadryl. No side effects or daytime sedation with current dose. No concerns with balance at night during bathroom trips. Usually wakes up to urinate 1x. No wearing off effect. Feels rested upon awakening. No drug effect    Reports regular exercise 3 to 4x/week of aerobics and strength training and abstaining from caffeine late in the day has dramatically improved quality of sleep. He falls asleep and remains asleep with exception of occasional nocturia x1 without difficulty.    2. For DON, he continues to use TAP-3 oral appliance, which has been fully titrated and continues to result in symptomatic improvement. Better sleep continuity. Made by Dr. Briggs. He does not have any report of snoring, gasping, or snorting when using it. No TMJ no pain. No issues with using it.    He has [employer] about a year ago. Feels he is doing well with it; he does not need to make any changes.     ESS = 4 (prior 5)    PRIOR SLEEP HISTORY:  Historically, he has seen Dr. Najera in the past for sleep problems. He had 2 sleep studies, showing PLMS and DON. He was treated with oral appliance and medication for limb movements.  He attempted CPAP, but he was unable to tolerate it.  He has been taking clonazepam 0.5 mg for years, then reduced to 1/2 tablet about a month ago.  Medications: He only takes clonazepam and Flomax 0.4 mg  Baseline PSG 7/30/08: 18 hypopneas, based on 155 minutes of sleep;   PSG 1/20/09: (with oral appliance) - 1 hypopnea, 125 limb movements (36% associated with arousals); LM index 23.3  He also complains of nasal  "congestion, which congests one side at a time.  Noted deviated septum  Patient reports caffeine (coffee with dinner) and tea at dinner.    SLEEP ROUTINE:   Bed partner: lives alone ( since 2011)   Time to bed: 10-11 pm   Sleep onset latency: 5 mins   Disruptions or awakenings: 1-3 times   Wakeup time: 5:30-7:15 am   Perceived sleep quality: 3-4 our of 5   Daytime naps: 0-1    Past Medical History:   Diagnosis Date    Arthritis     Cancer     Cataract        Past Surgical History:   Procedure Laterality Date    COLONOSCOPY  2012    EYE SURGERY      TONSILLECTOMY         History reviewed. No pertinent family history.    Social History   Substance Use Topics    Smoking status: Former Smoker     Packs/day: 1.00     Years: 15.00     Types: Cigarettes    Smokeless tobacco: Never Used    Alcohol use 4.2 oz/week     7 Glasses of wine per week   Retired pediatric cardiologist    ALLERGIES: Reviewed in EPIC    CURRENT MEDICATIONS: Reviewed in EPIC    REVIEW OF SYSTEMS:  Sleep related symptoms as per HPI;    Weight down 2 lbs from last visit   Mood disturbance ( x 6 years) much improved, especially after moving from primary residence to senior care community housing.      PHYSICAL EXAM:     BP (!) 148/57 (BP Location: Right arm, Patient Position: Sitting, BP Method: Large (Automatic))   Pulse (!) 53   Ht 5' 6" (1.676 m)   Wt 70.5 kg (155 lb 6.8 oz)   BMI 25.09 kg/m²   GENERAL: Well groomed; Normally developed      ASSESSMENT:    1. Obstructive Sleep Apnea, mild, based on prior AHI on sleep study. Treated with oral appliance (TAP-3 device) by Dr. Briggs. A follow up sleep study demonstrated resolution of mild DON with the device. He continues to use it without difficulty or complaint.    2. Sleep disturbances NEC - treated successfully in the past with clonazepam 0.5 mg. This could be masking normal physiologic awakenings seen with aging. Lowering dose to 0.25 mg, may have resulted in more frequent " awakenings (or awareness thereof).  Whether this sleep interruption is due to mild PLMS, mild DON, or a facet of natural aging is unknown.  x6 years, so mood may be playing a role as well. Regardless, he has seemingly responded well to clonazepam in the past, and patient has responded well to modest increases of this to 0.75 mg. No side effects or daytime sedation.    3. New memory loss complaints. Today, he drove to [facility] hospital facility for his appointment. He reports other instances of absent-mindedness. I have reminded him to stop benadryl, and provided written instructions.         PLAN:    Treatment:  1. Continue clonazepam to 0.5 mg. If adverse effect (daytime sedation or cognitive complaints) or not tolerated, then consider low dose SSRI. He has been doing well on this for quite some time, so no change currently indicated.  2. Limit caffeine to 1st half of the day.  3. Continue use of nightly TAP-3 device  4. Stop Benadryl, if no utility. Patient started this on his own pre-clonazepam. May be able to discontinue without disruption of sleep, and would likely benefit from removing this from his regimen, especially given memory concerns and potential for anti-cholinergic side effects.  5. Referral to Dr. Modi for memory loss    Precautions: The patient was advised to abstain from driving should they feel sleepy or drowsy.    Follow up: 6-12 months. MD/NP

## 2017-11-16 NOTE — PATIENT INSTRUCTIONS
1. Continue clonazepam to 0.5 mg tab nightly  2. Continue melatonin 3 mg nightly  3. Stop Benadryl (this can interfere with your memory)    I will refer you to see Dr. Modi (memory specialist). He practices at [facility] on [address] (right next door to sleep clinic)

## 2017-12-27 ENCOUNTER — OFFICE VISIT (OUTPATIENT)
Dept: NEUROLOGY | Facility: CLINIC | Age: 82
End: 2017-12-27
Payer: MEDICARE

## 2017-12-27 VITALS
WEIGHT: 158.31 LBS | HEIGHT: 66 IN | DIASTOLIC BLOOD PRESSURE: 54 MMHG | HEART RATE: 55 BPM | SYSTOLIC BLOOD PRESSURE: 141 MMHG | BODY MASS INDEX: 25.44 KG/M2

## 2017-12-27 DIAGNOSIS — R00.1 BRADYCARDIA: ICD-10-CM

## 2017-12-27 DIAGNOSIS — I67.9 CEREBROVASCULAR DISEASE: ICD-10-CM

## 2017-12-27 DIAGNOSIS — G47.33 OSA (OBSTRUCTIVE SLEEP APNEA): ICD-10-CM

## 2017-12-27 DIAGNOSIS — R41.840 ATTENTION AND CONCENTRATION DEFICIT: Primary | ICD-10-CM

## 2017-12-27 DIAGNOSIS — R41.3 MEMORY LOSS: ICD-10-CM

## 2017-12-27 PROCEDURE — 99999 PR PBB SHADOW E&M-EST. PATIENT-LVL III: CPT | Mod: PBBFAC,,, | Performed by: PSYCHIATRY & NEUROLOGY

## 2017-12-27 PROCEDURE — 99215 OFFICE O/P EST HI 40 MIN: CPT | Mod: S$GLB,,, | Performed by: PSYCHIATRY & NEUROLOGY

## 2017-12-27 NOTE — PATIENT INSTRUCTIONS
Discussed with patient.  He has minimal memory difficulties that may be age-related however the possibility of mild cognitive impairment on a vascular basis is a possibility.  Other contributing factors would include the history of obstructive sleep apnea and associated sleep disturbance.  The patient would not be a candidate for any of the memory medications because of his age and history of bradycardia.  Advised him to continue with present level of activities including exercise program.  Advised OTC sublingual vitamin B12 mentation.  Continue follow-up at the sleep lab.  He has had an MRI scan of the brain done in March 2017 and this will not be repeated.  He will be seen by me in follow-up if any new neurological problems.

## 2017-12-27 NOTE — PROGRESS NOTES
Subjective:       Patient ID: Chivo Hawkins is a 89 y.o. male.    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  The patient does admit that he has occasional difficulty with attention span and on one occasion went to the wrong hospital but then realized his, and was able to correct himself.  He has occasional difficulty with names however does have delayed recall.  He presently resides at the [facility], [facility] facility, since then that of his wife.  He has been day for 2 years and has been fairly active.  He exercises on a regular basis at the country [employer] where he was a member.  He continues to drive in the city without any problems otherwise.  He takes care of his day-to-day activities including managing his medications and finances, without any problems.    He came to the clinic alone having driven here from [facility] [place].    He had been seen by Dr. Coffey earlier this year for transient gait difficulty and the possibility of a TIA was considered.  He had an MRI scan of the brain done that showed mild generalized cerebral volume loss and findings of chronic microvascular ischemic disease.  No acute abnormality.  It was reported he may have had a small stroke in the past with a visual field deficit that has now improved.  Patient does not have any details of that event.  Recent labs done were reviewed.  Medications were reviewed.  He does have a history of sleep apnea and sleep disturbance and is presently on clonazepam for sleep, which has helped.  He was on Benadryl which was discontinued because of his memory difficulty.       Review of Systems  Review of Systems   Constitutional: Negative.    HENT: Negative.  Negative for hearing loss.    Eyes: Negative.  Negative for visual disturbance.   Respiratory: Negative.  Negative for shortness of breath.     Cardiovascular: Negative.  Negative for chest pain and palpitations.   Gastrointestinal: Negative.    Endocrine: Negative.    Genitourinary: Negative.    Musculoskeletal: Positive for arthralgias. Negative for back pain and gait problem.   Skin: Negative.    Allergic/Immunologic: Negative.    Neurological: Negative.  Negative for dizziness, tremors, seizures, syncope, speech difficulty, weakness, numbness and headaches.   Hematological: Negative.    Psychiatric/Behavioral: Positive for decreased concentration. Negative for sleep disturbance.       Objective:      Neurologic Exam     Mental Status   Oriented to person, place, and time.   Registration: recalls 3 of 3 objects. Recall at 5 minutes: recalls 2 of 3 objects. Follows 3 step commands.   Attention: normal. Concentration: normal.   Speech: speech is normal   Level of consciousness: alert  Knowledge: good.   Able to name object. Able to read. Able to repeat. Able to write. Normal comprehension.     Cranial Nerves   Cranial nerves II through XII intact.     Motor Exam   Muscle bulk: normal  Overall muscle tone: normal    Strength   Strength 5/5 throughout.     Sensory Exam   Light touch normal.   Proprioception normal.   Pinprick normal.     Gait, Coordination, and Reflexes     Gait  Gait: normal    Coordination   Romberg: negative  Finger to nose coordination: normal    Tremor   Resting tremor: absent  Intention tremor: absent  Action tremor: absent    Reflexes   Right brachioradialis: 1+  Left brachioradialis: 1+  Right biceps: 1+  Left biceps: 1+  Right triceps: 1+  Left triceps: 1+  Right patellar: 1+  Left patellar: 1+  Right achilles: 1+  Left achilles: 1+  Right plantar: normal  Left plantar: normal      Physical Exam   Constitutional: He is oriented to person, place, and time. He appears well-developed and well-nourished.   HENT:   Head: Normocephalic and atraumatic.   Neck: Normal range of motion. Neck supple. Carotid bruit is not present.    Neurological: He is oriented to person, place, and time. He has normal strength. He has a normal Finger-Nose-Finger Test and a normal Romberg Test. Gait normal.   Reflex Scores:       Tricep reflexes are 1+ on the right side and 1+ on the left side.       Bicep reflexes are 1+ on the right side and 1+ on the left side.       Brachioradialis reflexes are 1+ on the right side and 1+ on the left side.       Patellar reflexes are 1+ on the right side and 1+ on the left side.       Achilles reflexes are 1+ on the right side and 1+ on the left side.  Psychiatric: His speech is normal.   Vitals reviewed.        Assessment:        1. Attention and concentration deficit    2. Cerebrovascular disease    3. Memory loss    4. DON (obstructive sleep apnea)    5. Bradycardia            Plan:       Discussed with patient.  He has minimal memory difficulties that may be age-related however the possibility of mild cognitive impairment on a vascular basis is a possibility.  Other contributing factors would include the history of obstructive sleep apnea and associated sleep disturbance.  The patient would not be a candidate for any of the memory medications because of his age and history of bradycardia.  Advised him to continue with present level of activities including exercise program.  Advised OTC sublingual vitamin B12 mentation.  Continue follow-up at the sleep lab.  He has had an MRI scan of the brain done in March 2017 and this will not be repeated.  He will be seen by me in follow-up if any new neurological problems.

## 2018-02-28 ENCOUNTER — OFFICE VISIT (OUTPATIENT)
Dept: INFECTIOUS DISEASES | Facility: CLINIC | Age: 83
End: 2018-02-28
Payer: MEDICARE

## 2018-02-28 VITALS
SYSTOLIC BLOOD PRESSURE: 143 MMHG | DIASTOLIC BLOOD PRESSURE: 62 MMHG | BODY MASS INDEX: 26.33 KG/M2 | TEMPERATURE: 99 F | WEIGHT: 158.06 LBS | HEIGHT: 65 IN | HEART RATE: 78 BPM

## 2018-02-28 DIAGNOSIS — J01.90 SUBACUTE SINUSITIS, UNSPECIFIED LOCATION: Primary | ICD-10-CM

## 2018-02-28 PROCEDURE — 1159F MED LIST DOCD IN RCRD: CPT | Mod: S$GLB,,, | Performed by: INTERNAL MEDICINE

## 2018-02-28 PROCEDURE — 99212 OFFICE O/P EST SF 10 MIN: CPT | Mod: S$GLB,,, | Performed by: INTERNAL MEDICINE

## 2018-02-28 PROCEDURE — 3008F BODY MASS INDEX DOCD: CPT | Mod: S$GLB,,, | Performed by: INTERNAL MEDICINE

## 2018-02-28 PROCEDURE — 1126F AMNT PAIN NOTED NONE PRSNT: CPT | Mod: S$GLB,,, | Performed by: INTERNAL MEDICINE

## 2018-02-28 PROCEDURE — 99999 PR PBB SHADOW E&M-EST. PATIENT-LVL III: CPT | Mod: PBBFAC,,, | Performed by: INTERNAL MEDICINE

## 2018-02-28 RX ORDER — DOXYCYCLINE 100 MG/1
100 CAPSULE ORAL 2 TIMES DAILY
Qty: 20 CAPSULE | Refills: 0 | Status: SHIPPED | OUTPATIENT
Start: 2018-02-28 | End: 2018-04-30 | Stop reason: SDUPTHER

## 2018-02-28 NOTE — PROGRESS NOTES
Subjective:      Patient ID: Chivo Hawkins is a 89 y.o. male.    Chief Complaint:Cough and Nasal Congestion      History of Present Illness    Has had sx of respiratory infection for 2 weeks and is not improving. He started with nasal congestion, cough and perhaps slight fever, not documented. Denies dyspnea or wheezing or any CHF symptoms. Some OTC meds without benefit. Nose is not stopped up and no ear sx.    Review of Systems   Constitution: Negative for chills, decreased appetite, fever, weakness, malaise/fatigue, night sweats, weight gain and weight loss.   HENT: Positive for congestion. Negative for ear pain, hearing loss, hoarse voice, sore throat and tinnitus.    Eyes: Negative for blurred vision, redness and visual disturbance.   Cardiovascular: Negative for chest pain, leg swelling and palpitations.   Respiratory: Positive for cough and sputum production. Negative for hemoptysis and shortness of breath.    Hematologic/Lymphatic: Negative for adenopathy. Does not bruise/bleed easily.   Skin: Negative for dry skin, itching, rash and suspicious lesions.   Musculoskeletal: Negative for back pain, joint pain, myalgias and neck pain.   Gastrointestinal: Negative for abdominal pain, constipation, diarrhea, heartburn, nausea and vomiting.   Genitourinary: Negative for dysuria, flank pain, frequency, hematuria, hesitancy and urgency.   Neurological: Negative for dizziness, headaches, numbness and paresthesias.   Psychiatric/Behavioral: Negative for depression and memory loss. The patient does not have insomnia and is not nervous/anxious.      Objective:   Physical Exam   Constitutional: He appears well-developed and well-nourished.   HENT:   Right Ear: External ear normal.   Left Ear: External ear normal.   Nose: Nose normal.   Mouth/Throat: Oropharynx is clear and moist.   Neck: No thyromegaly present.   Cardiovascular: Exam reveals no gallop and no friction rub.    No murmur heard.  Pulmonary/Chest: Effort  normal and breath sounds normal. No respiratory distress. He has no wheezes. He has no rales.   Lymphadenopathy:     He has no cervical adenopathy.   Vitals reviewed.    Assessment:       1. Subacute sinusitis, unspecified location          Plan:        1. Doxycyline x 10 days; call if no improvement

## 2018-04-30 DIAGNOSIS — G47.9 SLEEP DISTURBANCE: ICD-10-CM

## 2018-04-30 RX ORDER — DOXYCYCLINE 100 MG/1
CAPSULE ORAL
Qty: 20 CAPSULE | Refills: 0 | Status: SHIPPED | OUTPATIENT
Start: 2018-04-30 | End: 2018-06-21 | Stop reason: SDUPTHER

## 2018-04-30 NOTE — TELEPHONE ENCOUNTER
Controlled substance prescription Refill request for the following medication:    Clonazepam 0.5mg tablets

## 2018-05-01 DIAGNOSIS — G47.9 SLEEP DISTURBANCE: ICD-10-CM

## 2018-05-01 RX ORDER — CLONAZEPAM 0.5 MG/1
TABLET ORAL
Qty: 30 TABLET | Refills: 5 | Status: SHIPPED | OUTPATIENT
Start: 2018-05-01 | End: 2018-05-01 | Stop reason: SDUPTHER

## 2018-05-01 RX ORDER — CLONAZEPAM 0.5 MG/1
TABLET ORAL
Qty: 30 TABLET | Refills: 5 | Status: SHIPPED | OUTPATIENT
Start: 2018-05-01 | End: 2018-07-03 | Stop reason: ALTCHOICE

## 2018-05-17 ENCOUNTER — TELEPHONE (OUTPATIENT)
Dept: SLEEP MEDICINE | Facility: CLINIC | Age: 83
End: 2018-05-17

## 2018-05-17 RX ORDER — RIVASTIGMINE TARTRATE 3 MG/1
CAPSULE ORAL
Refills: 2 | COMMUNITY
Start: 2018-03-12

## 2018-05-17 NOTE — TELEPHONE ENCOUNTER
Patients mother stated that his memory is coming so rapidly and its worrying her and she's concerned the Klonopin could be the issue is there anything else this patient can take to bring back his memory? She also wanted to know can he take Trazodone for sleep?    Can she take him off the Klonopin?    Miquel Dumont MA

## 2018-05-17 NOTE — TELEPHONE ENCOUNTER
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    I was not able to disclose health related information, since I do not have patient's expressed permission and she is not the power of .    I did recommend that she speak with her father and ask if she could accompany him on his next clinic visit, so we could have discussion.    Patient daughter expressed understanding.

## 2018-05-17 NOTE — TELEPHONE ENCOUNTER
----- Message from Cristel Hazel sent at 5/17/2018 10:55 AM CDT -----  Name of Who is Calling: [place] (Daughter)      What is the request in detail: [place] would like to have a conversation about the pts progress. She states she really concerned about the pt.      Can the clinic reply by [facility]:    No       What Number to Call Back if not in [facility]: [phone]   none

## 2018-05-22 ENCOUNTER — OFFICE VISIT (OUTPATIENT)
Dept: SLEEP MEDICINE | Facility: CLINIC | Age: 83
End: 2018-05-22
Payer: MEDICARE

## 2018-05-22 VITALS
WEIGHT: 158.31 LBS | HEIGHT: 64 IN | BODY MASS INDEX: 27.03 KG/M2 | HEART RATE: 90 BPM | SYSTOLIC BLOOD PRESSURE: 142 MMHG | DIASTOLIC BLOOD PRESSURE: 60 MMHG

## 2018-05-22 DIAGNOSIS — G47.33 OSA (OBSTRUCTIVE SLEEP APNEA): Primary | ICD-10-CM

## 2018-05-22 DIAGNOSIS — G47.9 SLEEP DISTURBANCES: ICD-10-CM

## 2018-05-22 DIAGNOSIS — R41.3 MEMORY LOSS: ICD-10-CM

## 2018-05-22 PROCEDURE — 99999 PR PBB SHADOW E&M-EST. PATIENT-LVL III: CPT | Mod: PBBFAC,,, | Performed by: PSYCHIATRY & NEUROLOGY

## 2018-05-22 PROCEDURE — 99214 OFFICE O/P EST MOD 30 MIN: CPT | Mod: S$GLB,,, | Performed by: PSYCHIATRY & NEUROLOGY

## 2018-05-22 RX ORDER — CLONAZEPAM 0.5 MG/1
1 TABLET ORAL
COMMUNITY
End: 2018-07-03 | Stop reason: SDUPTHER

## 2018-05-22 NOTE — PROGRESS NOTES
This 89 y.o. male patient returns for the management of obstructive sleep apnea and sleep disruption.    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    1. 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 Sleep quality much improved with combination of clonazepam. No side effects or daytime sedation with current dose. No concerns with balance at night during bathroom trips. Usually wakes up to urinate 1x. No wearing off effect. Feels rested upon awakening. No drug effect    Benadryl was stopped.    2. For DON, he was usingTAP-3 oral appliance, which has been fully titrated and continues to result in symptomatic improvement. Better sleep continuity. Made by Dr. Briggs. However, recently discontinued, because he felt it was no longer helping.    However, he has been having more memory issues in the last month.    He has BlueCava about a year ago. Feels he is doing well with it; he does not need to make any changes.     ESS = 4 (prior 5)    PRIOR SLEEP HISTORY:  Historically, he has seen Dr. Najera in the past for sleep problems. He had 2 sleep studies, showing PLMS and DON. He was treated with oral appliance and medication for limb movements.  He attempted CPAP, but he was unable to tolerate it.  He has been taking clonazepam 0.5 mg for years, then reduced to 1/2 tablet about a month ago.  Medications: He only takes clonazepam and Flomax 0.4 mg  Baseline PSG 7/30/08: 18 hypopneas, based on 155 minutes of sleep;   PSG 1/20/09: (with oral appliance) - 1 hypopnea, 125 limb movements (36% associated with arousals); LM index 23.3  He also complains of nasal congestion, which congests one side at a time.  Noted deviated septum  Patient reports caffeine (coffee with dinner) and tea at dinner.    SLEEP ROUTINE:   Bed partner: lives alone ( since 2011)   Time to bed: 10-11 pm   Sleep onset latency: 5  "mins   Disruptions or awakenings: 1-3 times   Wakeup time: 5:30-7:15 am   Perceived sleep quality: 3-4 our of 5   Daytime naps: 0-1    Past Medical History:   Diagnosis Date    Arthritis     Cancer     Cataract        Past Surgical History:   Procedure Laterality Date    COLONOSCOPY  2012    EYE SURGERY      TONSILLECTOMY         No family history on file.    Social History   Substance Use Topics    Smoking status: Former Smoker     Packs/day: 1.00     Years: 15.00     Types: Cigarettes    Smokeless tobacco: Never Used    Alcohol use 4.2 oz/week     7 Glasses of wine per week   Retired pediatric cardiologist    ALLERGIES: Reviewed in EPIC    CURRENT MEDICATIONS: Reviewed in EPIC    REVIEW OF SYSTEMS:  Sleep related symptoms as per HPI;    Weight down 2 lbs from last visit   Mood disturbance ( x 6 years) much improved, especially after moving from primary residence to custodial community housing.      PHYSICAL EXAM:  BP (!) 142/60   Pulse 90   Ht 5' 4" (1.626 m)   Wt 71.8 kg (158 lb 4.6 oz)   BMI 27.17 kg/m²   GENERAL: Well groomed; Normally developed      ASSESSMENT:    1. Obstructive Sleep Apnea, mild, based on prior AHI on sleep study. Previously treated with oral appliance (TAP-3 device) by Dr. Briggs. A follow up sleep study demonstrated resolution of mild DON with the device. He should resume using the oral appliance    2. Sleep disturbances NEC - treated successfully in the past with clonazepam 0.5 mg. This could be masking normal physiologic awakenings seen with aging. Lowering dose to 0.25 mg is indicated, given his recent memory decline in the last few months. The goal will be to wean off of this completely, although slowly, since he has been on this medication for about 10 years (started by Dr. Hensley in 2008.)    3. New worsening memory loss complaints. He was accompanied by his daughter today. Patient has stopped his DON treatment since last visit, which may have led to symptomatic " decline since last visit. I have encouraged him to resume use of his TAP-3 oral appliance device and follow up with Dr. Modi.         PLAN:    Treatment:  1. Wean clonazepam to 0.25 mg (cut his current 0.5 mg pills in half) for one month. After one month, start taking the 1/2 tabs every other night. After that time, discontinue clonazepam altogether.  2. Okay to take melatonin 3 mg nightly for sleep.  3. Resume use of nightly TAP-3 device (oral appliance to treat sleep apnea)  4. Follow up with Dr. Modi for memory loss and recent decline since 12/27/17    Precautions: The patient was advised to abstain from driving should they feel sleepy or drowsy.    Follow up: 6 months. MD/NP

## 2018-05-22 NOTE — PATIENT INSTRUCTIONS
1. Wean clonazepam to 0.25 mg (cut his current 0.5 mg pills in half) for one month, taken nightly. After one month, start taking the 1/2 tabs every other night for a month. After that time, discontinue clonazepam altogether.  2. Okay to take melatonin 3 mg nightly for sleep.  3. Resume use of nightly TAP-3 device (oral appliance to treat sleep apnea). Fitted by Dr. Didi Briggs DDS  4. Follow up with Dr. Modi for memory loss and recent decline since 12/27/17

## 2018-06-04 ENCOUNTER — HOSPITAL ENCOUNTER (EMERGENCY)
Facility: HOSPITAL | Age: 83
Discharge: HOME | End: 2018-06-04
Attending: EMERGENCY MEDICINE
Payer: MEDICARE

## 2018-06-04 VITALS
HEART RATE: 68 BPM | WEIGHT: 158 LBS | DIASTOLIC BLOOD PRESSURE: 70 MMHG | RESPIRATION RATE: 15 BRPM | BODY MASS INDEX: 26.33 KG/M2 | OXYGEN SATURATION: 97 % | SYSTOLIC BLOOD PRESSURE: 159 MMHG | HEIGHT: 65 IN | TEMPERATURE: 98 F
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
## 2018-06-04 DIAGNOSIS — R07.9 CHEST PAIN: ICD-10-CM

## 2018-06-04 DIAGNOSIS — R00.2 PALPITATIONS: ICD-10-CM

## 2018-06-04 LAB
ALBUMIN SERPL BCP-MCNC: 3.9 G/DL
ALP SERPL-CCNC: 50 U/L
ALT SERPL W/O P-5'-P-CCNC: 16 U/L
ANION GAP SERPL CALC-SCNC: 10 MMOL/L
AST SERPL-CCNC: 23 U/L
BASOPHILS # BLD AUTO: 0.02 K/UL
BASOPHILS NFR BLD: 0.3 %
BILIRUB SERPL-MCNC: 0.8 MG/DL
BILIRUB UR QL STRIP: NEGATIVE
BNP SERPL-MCNC: 51 PG/ML
BUN SERPL-MCNC: 19 MG/DL
CALCIUM SERPL-MCNC: 9.4 MG/DL
CHLORIDE SERPL-SCNC: 106 MMOL/L
CLARITY UR REFRACT.AUTO: CLEAR
CO2 SERPL-SCNC: 22 MMOL/L
COLOR UR AUTO: YELLOW
CREAT SERPL-MCNC: 1 MG/DL
DIFFERENTIAL METHOD: ABNORMAL
EOSINOPHIL # BLD AUTO: 0 K/UL
EOSINOPHIL NFR BLD: 0.5 %
ERYTHROCYTE [DISTWIDTH] IN BLOOD BY AUTOMATED COUNT: 13.6 %
EST. GFR  (AFRICAN AMERICAN): >60 ML/MIN/1.73 M^2
EST. GFR  (NON AFRICAN AMERICAN): >60 ML/MIN/1.73 M^2
GLUCOSE SERPL-MCNC: 118 MG/DL
GLUCOSE UR QL STRIP: NEGATIVE
HCT VFR BLD AUTO: 42.4 %
HGB BLD-MCNC: 14.4 G/DL
HGB UR QL STRIP: NEGATIVE
HYALINE CASTS UR QL AUTO: 1 /LPF
IMM GRANULOCYTES # BLD AUTO: 0.02 K/UL
IMM GRANULOCYTES NFR BLD AUTO: 0.3 %
INR PPP: 1.1
KETONES UR QL STRIP: ABNORMAL
LEUKOCYTE ESTERASE UR QL STRIP: NEGATIVE
LYMPHOCYTES # BLD AUTO: 0.5 K/UL
LYMPHOCYTES NFR BLD: 8.7 %
MAGNESIUM SERPL-MCNC: 2.2 MG/DL
MCH RBC QN AUTO: 31.3 PG
MCHC RBC AUTO-ENTMCNC: 34 G/DL
MCV RBC AUTO: 92 FL
MICROSCOPIC COMMENT: NORMAL
MONOCYTES # BLD AUTO: 0.4 K/UL
MONOCYTES NFR BLD: 6.2 %
NEUTROPHILS # BLD AUTO: 5 K/UL
NEUTROPHILS NFR BLD: 84 %
NITRITE UR QL STRIP: NEGATIVE
NRBC BLD-RTO: 0 /100 WBC
PH UR STRIP: 7 [PH] (ref 5–8)
PLATELET # BLD AUTO: 189 K/UL
PMV BLD AUTO: 10.8 FL
POTASSIUM SERPL-SCNC: 4 MMOL/L
PROT SERPL-MCNC: 7 G/DL
PROT UR QL STRIP: NEGATIVE
PROTHROMBIN TIME: 11.3 SEC
RBC # BLD AUTO: 4.6 M/UL
RBC #/AREA URNS AUTO: 2 /HPF (ref 0–4)
SODIUM SERPL-SCNC: 138 MMOL/L
SP GR UR STRIP: 1.01 (ref 1–1.03)
TROPONIN I SERPL DL<=0.01 NG/ML-MCNC: <0.006 NG/ML
URN SPEC COLLECT METH UR: ABNORMAL
UROBILINOGEN UR STRIP-ACNC: NEGATIVE EU/DL
WBC # BLD AUTO: 5.95 K/UL
WBC #/AREA URNS AUTO: 1 /HPF (ref 0–5)

## 2018-06-04 PROCEDURE — 85610 PROTHROMBIN TIME: CPT

## 2018-06-04 PROCEDURE — 83880 ASSAY OF NATRIURETIC PEPTIDE: CPT

## 2018-06-04 PROCEDURE — 99284 EMERGENCY DEPT VISIT MOD MDM: CPT | Mod: 25

## 2018-06-04 PROCEDURE — 80053 COMPREHEN METABOLIC PANEL: CPT

## 2018-06-04 PROCEDURE — 83735 ASSAY OF MAGNESIUM: CPT

## 2018-06-04 PROCEDURE — 81001 URINALYSIS AUTO W/SCOPE: CPT

## 2018-06-04 PROCEDURE — 85025 COMPLETE CBC W/AUTO DIFF WBC: CPT

## 2018-06-04 PROCEDURE — 93005 ELECTROCARDIOGRAM TRACING: CPT

## 2018-06-04 PROCEDURE — 93010 ELECTROCARDIOGRAM REPORT: CPT | Mod: ,,, | Performed by: INTERNAL MEDICINE

## 2018-06-04 PROCEDURE — 99284 EMERGENCY DEPT VISIT MOD MDM: CPT | Mod: ,,, | Performed by: EMERGENCY MEDICINE

## 2018-06-04 PROCEDURE — 84484 ASSAY OF TROPONIN QUANT: CPT

## 2018-06-04 RX ORDER — CALCIUM CARBONATE 600 MG
600 TABLET ORAL ONCE
COMMUNITY
End: 2018-07-03

## 2018-06-04 RX ORDER — SODIUM CHLORIDE 9 MG/ML
125 INJECTION, SOLUTION INTRAVENOUS ONCE
Status: DISCONTINUED | OUTPATIENT
Start: 2018-06-04 | End: 2018-06-04

## 2018-06-04 RX ORDER — SENNOSIDES 8.6 MG/1
1 TABLET ORAL DAILY
COMMUNITY
End: 2018-07-03

## 2018-06-04 NOTE — ED PROVIDER NOTES
Encounter Date: 6/4/2018    SCRIBE #1 NOTE: I, Lisa Garcia, am scribing for, and in the presence of,  Dr. Jay. I have scribed the following portions of the note - Other sections scribed: HPI,ROS,MDM,Attending ED notes.       History     Chief Complaint   Patient presents with    Palpitations     hx dementia, told daughter irregular heart beat,      Time patient was seen by the provider: 1:43 PM      The patient is a 89 y.o. male with co-morbidities including: dementia, arthritis and cancer who presents to the ED with a complaint of irregular heart rhythm and confusion. Patient called his daughter and told her that he had an irregular heart rhythm so was brought to ED. Now pt denies ever saying this. Pt is asymptomatic at this time. Daughter concerned that pt was not wearing his glasses which he normally always wears and sitting very still in his wheelchair at the [facility]. Pt reports that he has been feeling at his baseline the last few days. Pt exercises everyday with no difficulty. Pt's daughter states that pt said that he has not been taking his medication but patient does not remember saying that. Pt denies chest pain, SOB, abdominal pain, leg swelling, calf pain, appetite change, and difficulty urinating.       The history is provided by the patient, a relative and medical records.     Review of patient's allergies indicates:   Allergen Reactions    Pcn [penicillins] Rash     Past Medical History:   Diagnosis Date    Arthritis     Cancer     Cataract      Past Surgical History:   Procedure Laterality Date    COLONOSCOPY  2012    EYE SURGERY      TONSILLECTOMY       History reviewed. No pertinent family history.  Social History   Substance Use Topics    Smoking status: Former Smoker     Packs/day: 1.00     Years: 15.00     Types: Cigarettes    Smokeless tobacco: Never Used    Alcohol use 4.2 oz/week     7 Glasses of wine per week     Review of Systems   Constitutional: Negative for  appetite change, chills and fever.   HENT: Negative for congestion.    Eyes: Negative for pain.   Respiratory: Negative for shortness of breath.    Cardiovascular: Negative for chest pain.   Gastrointestinal: Negative for abdominal pain.   Genitourinary: Negative for difficulty urinating.   Musculoskeletal: Negative for myalgias.   Skin: Negative for rash.   Neurological: Negative for headaches.   Psychiatric/Behavioral: Positive for confusion.       Physical Exam     Initial Vitals [06/04/18 1309]   BP Pulse Resp Temp SpO2   (!) 181/74 76 18 97.8 °F (36.6 °C) 98 %      MAP       109.67         Physical Exam    Constitutional: He does not appear ill. No distress.   Poor short term memory   HENT:   Head: Normocephalic and atraumatic.   Eyes: Conjunctivae and lids are normal.   Neck: Trachea normal. Neck supple.   Cardiovascular: Normal rate, regular rhythm and normal heart sounds.   Pulmonary/Chest: No accessory muscle usage. No tachypnea. No respiratory distress.   Abdominal: He exhibits no distension. There is no tenderness.         ED Course   Procedures  Labs Reviewed   CBC W/ AUTO DIFFERENTIAL - Abnormal; Notable for the following:        Result Value    MCH 31.3 (*)     Lymph # 0.5 (*)     Gran% 84.0 (*)     Lymph% 8.7 (*)     All other components within normal limits   COMPREHENSIVE METABOLIC PANEL - Abnormal; Notable for the following:     CO2 22 (*)     Glucose 118 (*)     Alkaline Phosphatase 50 (*)     All other components within normal limits   URINALYSIS - Abnormal; Notable for the following:     Ketones, UA 1+ (*)     All other components within normal limits   TROPONIN I   B-TYPE NATRIURETIC PEPTIDE    Narrative:     ADD ON PT PER DR. BERTHA WALTON AT  06/04/2018  15:20 (BLUEX)  ADD-ON MG #[id number] PER BERTHA WALTON MD 15:42  06/04/2018    PROTIME-INR   MAGNESIUM   URINALYSIS MICROSCOPIC   PROTIME-INR    Narrative:     ADD ON PT PER DR. BERTHA WALTON AT  06/04/2018  15:20  (BLUEX)   MAGNESIUM    Narrative:     ADD ON PT PER DR. BERTHA WALTON AT  06/04/2018  15:20 (BLUEX)  ADD-ON MG #[id number] PER BERTHA WALTON MD 15:42  06/04/2018              Medical Decision Making:   History:   Old Medical Records: I decided to obtain old medical records.  Initial Assessment:   89 y.o. male brought in by his daughter after apparently pt told his daughter that he had irregular pulse. There was nurse who responded and evaluated the pt and she states that she was concerned because he was more confused and did not look good. There was no mention of tachycardia or irregular heart beat. Pt has early dementia and short tem memory is non-existent.   Clinical Tests:   Lab Tests: Ordered and Reviewed  Radiological Study: Ordered and Reviewed  Medical Tests: Ordered and Reviewed  ED Management:  Will do labs, EKG, CXR and await for results of this.              Scribe Attestation:   Scribe #1: I performed the above scribed service and the documentation accurately describes the services I performed. I attest to the accuracy of the note.    Attending Attestation:             Attending ED Notes:   16:48  89 y.o. male who has significant short term memory lost apparently complained of palpitation earlier today which he does not remember. Nurse who responded said that pt did not look well so was brought here to the hospital for evaluation. Pt has no complaints now. Pt evaluated with blood work, EKG and CXR that all looked well. Daughter is reassured and pt has neurology appointment in two weeks to evaluate for his mental status. The nurse will be alerted to watch out for any further changes at [facility].              Clinical Impression:   Diagnoses of Chest pain, Palpitations, Fall, Fall, and Fall were pertinent to this visit.    X-Ray Chest AP Portable   Final Result      1. No acute cardiopulmonary process, hypoventilatory exam.         Electronically signed by: Myles Perez MD    Date:    06/04/2018   Time:    14:30          Disposition:   Disposition: Discharged  Condition: Stable                        Isacc Jay MD  06/10/18 4501

## 2018-06-04 NOTE — ED TRIAGE NOTES
Per pt daughter, daughter called pt this morning and pt was c/o irregularly heart beats and increased blood pressure. Pt also told daughter he was confused. Pt A&O x3 during triage; Disoriented to situation. Pt oriented to self, place, and time. Upon arrival to pt room, pt unsure of why he is here, what allergies he has, and his PMH. Per pt daughter, pt is confused, not at baseline, and states pt told he has stopped taking all his regular home medications. When asked, pt denies all his home medications and PMH. Pt denies any chest pain, N/V/D, abdominal pain, fevers, irregular heart beats, or difficulty with bowels and urination.

## 2018-06-04 NOTE — ED NOTES
Pt alert and stable for discharge. Pt discharged with Daughter as transportation home. Discharge instructions and follow up care reviewed with patient and patient's Daughter.

## 2018-06-11 ENCOUNTER — TELEPHONE (OUTPATIENT)
Dept: INFECTIOUS DISEASES | Facility: CLINIC | Age: 83
End: 2018-06-11

## 2018-06-11 DIAGNOSIS — F01.518 VASCULAR DEMENTIA WITH BEHAVIOR DISTURBANCE: Primary | ICD-10-CM

## 2018-06-18 ENCOUNTER — OFFICE VISIT (OUTPATIENT)
Dept: NEUROLOGY | Facility: CLINIC | Age: 83
End: 2018-06-18
Payer: MEDICARE

## 2018-06-18 VITALS
HEIGHT: 66 IN | DIASTOLIC BLOOD PRESSURE: 58 MMHG | BODY MASS INDEX: 24.77 KG/M2 | WEIGHT: 154.13 LBS | HEART RATE: 74 BPM | SYSTOLIC BLOOD PRESSURE: 143 MMHG

## 2018-06-18 DIAGNOSIS — R41.3 MEMORY DIFFICULTY: Primary | ICD-10-CM

## 2018-06-18 PROCEDURE — 99999 PR PBB SHADOW E&M-EST. PATIENT-LVL III: CPT | Mod: PBBFAC,,, | Performed by: PSYCHIATRY & NEUROLOGY

## 2018-06-18 PROCEDURE — 99213 OFFICE O/P EST LOW 20 MIN: CPT | Mod: S$GLB,,, | Performed by: PSYCHIATRY & NEUROLOGY

## 2018-06-18 NOTE — LETTER
June 19, 2018      Mandeep Bo MD  [address]  [place] [address]           [facility]  [address]  [place] [address]  Phone: [phone]  Fax: [phone]          Patient: Chivo Hawkins   MR Number: [id number]   YOB: 1928   Date of Visit: 6/18/2018       Dear Dr. Mandeep Bo:    Thank you for referring Chivo Hawkins to me for evaluation. Attached you will find relevant portions of my assessment and plan of care.    If you have questions, please do not hesitate to call me. I look forward to following Chivo Hawkins along with you.    Sincerely,    Edson Souza MD    Enclosure  CC:  No Recipients    If you would like to receive this communication electronically, please contact [email or web address] or [phone] to request more information on [facility] access.    For providers and/or their staff who would like to refer a patient to [facility], please contact us through our one-stop-shop provider referral line, [facility] [place], at [phone].    If you feel you have received this communication in error or would no longer like to receive these types of communications, please e-mail [email or web address]

## 2018-06-19 NOTE — PROGRESS NOTES
[facility] - NEUROLOGY  [facility] [place] [facility]    Date: June 19, 2018   Patient Name: Chivo Hawkins   MRN: [id number]   PCP: Mandeep Bo  Referring Provider: Mandeep Bo MD    Assessment:      This is Chivo Hawkins, 89 y.o. 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  He was started on excelon despite decision on prior neurology visit to with hold cholinergic medication due to history of symptomatic bradycardia, appears to be tolerating with stable EKG.   He likely should not be driving but discussion was deferred due to extremely poor memory and lack of insight.     Plan:      -  Continue excelon for now, would recommend EKG every 3-6 months  -  Labs for metabolic factors which may be contributing  -  Consider OT driving evaluation     Will follow up in memory clinic        I discussed side effects of the medications. I asked the patient to  stop the medication if he notices serious adverse effects as we discussed and to seek immediate medical attention at an ER.     Edson Souza MD  [facility]   Department of Neurology    Subjective:      HPI:   Mr. Chivo Hawkins is a 89 y.o. male who presents with a chief complaint of memory loss, he presents alone today    He was evaluated for this issue by neurologist Dr. Modi 12/2017 but has little recollection of that visit.  He was referred back by primary care and is unsure of reason for today's visit.  Continues to live at [facility], daily activities consist of driving to the gym and socializing with friends.  He is not able to report his medications but presents with a written list.      PAST MEDICAL HISTORY:  Past Medical History:   Diagnosis Date    Arthritis     Cancer     Cataract        PAST SURGICAL HISTORY:  Past Surgical History:   Procedure Laterality Date    COLONOSCOPY  2012    EYE SURGERY      TONSILLECTOMY         CURRENT MEDS:  Current Outpatient Prescriptions  "  Medication Sig Dispense Refill    aspirin 81 MG Chew Take 81 mg by mouth once daily.      calcium carbonate (OS-HARMONY) 600 mg calcium (1,500 mg) Tab Take 600 mg by mouth once.      clonazePAM (KLONOPIN) 0.5 MG tablet TAKE 1 TABLET BY MOUTH EVERY EVENING IF NEEDED FOR SLEEP 30 tablet 5    clonazePAM (KLONOPIN) 0.5 MG tablet Take 1 tablet by mouth.      diphenhydrAMINE (BENADRYL) 25 mg capsule Take 25 mg by mouth nightly as needed for Insomnia.      doxycycline (MONODOX) 100 MG capsule TAKE 1 CAPSULE(100 MG) BY MOUTH TWICE DAILY 20 capsule 0    fish oil-omega-3 fatty acids 300-1,000 mg capsule Take 2 g by mouth once daily.        levothyroxine (SYNTHROID) 100 MCG tablet Take 1 tablet (100 mcg total) by mouth every morning. 90 tablet 4    melatonin 3 mg Tab       MULTI-VITAMIN HI-PO ORAL Take by mouth.        psyllium (METAMUCIL) packet Take 1 packet by mouth once daily.      rivastigmine tartrate (EXELON) 3 MG capsule TK ONE C PO  BID  2    salicylic acid 6 % Sham Apply 1 application topically once daily.       senna (SENOKOT) 8.6 mg tablet Take 1 tablet by mouth once daily.      tamsulosin (FLOMAX) 0.4 mg Cp24 TAKE 1 CAPSULE BY MOUTH EVERY DAY 90 capsule 4     No current facility-administered medications for this visit.        ALLERGIES:  Review of patient's allergies indicates:   Allergen Reactions    Pcn [penicillins] Rash       FAMILY HISTORY:  No family history on file.    SOCIAL HISTORY:  Social History   Substance Use Topics    Smoking status: Former Smoker     Packs/day: 1.00     Years: 15.00     Types: Cigarettes    Smokeless tobacco: Never Used    Alcohol use 4.2 oz/week     7 Glasses of wine per week       Review of Systems:  12 review of systems is negative except for the symptoms mentioned in HPI.        Objective:     Vitals:    06/18/18 1515   BP: (!) 143/58   Pulse: 74   Weight: 69.9 kg (154 lb 1.6 oz)   Height: 5' 6" (1.676 m)       General: NAD, well nourished   Eyes: no tearing, " discharge, no erythema   ENT: moist mucous membranes of the oral cavity, nares patent    Neck: Supple, full range of motion  Cardiovascular: Warm and well perfused, pulses equal and symmetrical  Lungs: Normal work of breathing, normal chest wall excursions  Skin: No rash, lesions, or breakdown on exposed skin  Psychiatry: Mood and affect are appropriate   Abdomen: soft, non tender, non distended  Extremeties: No cyanosis, clubbing or edema.    Neurological   MENTAL STATUS: Well dressed and groomed, quite pleasant and appropriately conversant.  Able to give months in reverse and does well with abstraction.  Recall 0/3 +2 with multiple choice.  CRANIAL NERVES: Visual fields intact. PERRL. EOMI. Facial sensation intact. Face symmetrical. Hearing grossly intact. Full shoulder shrug bilaterally. Tongue protrudes midline   SENSORY: Sensation is intact to light touch throughout.    MOTOR: Normal bulk and tone. No pronator drift.  5/5 deltoid, biceps, triceps, interosseous, hand  bilaterally. 5/5 iliopsoas, knee extension/flexion, foot dorsi/plantarflexion bilaterally.    CEREBELLAR/COORDINATION/GAIT: Gait steady with normal arm swing and stride length.

## 2018-06-20 ENCOUNTER — TELEPHONE (OUTPATIENT)
Dept: NEUROLOGY | Facility: CLINIC | Age: 83
End: 2018-06-20

## 2018-06-20 NOTE — TELEPHONE ENCOUNTER
Patient Daughter stated that she just wanted to know then details of  His visit      ----- Message from Bo Jimenez sent at 6/19/2018 10:42 AM CDT -----  Needs Advice    Reason for call: [place] is asking to speak w/ the doctor to discuss details of pt's appt yesterday.  Communication Preference: [place] ([email or web address]) @ [phone]  Additional Information:

## 2018-06-21 RX ORDER — DOXYCYCLINE 100 MG/1
CAPSULE ORAL
Qty: 20 CAPSULE | Refills: 0 | Status: SHIPPED | OUTPATIENT
Start: 2018-06-21 | End: 2018-07-03

## 2018-07-03 ENCOUNTER — OFFICE VISIT (OUTPATIENT)
Dept: INFECTIOUS DISEASES | Facility: CLINIC | Age: 83
End: 2018-07-03
Payer: MEDICARE

## 2018-07-03 VITALS
SYSTOLIC BLOOD PRESSURE: 138 MMHG | DIASTOLIC BLOOD PRESSURE: 50 MMHG | HEART RATE: 53 BPM | BODY MASS INDEX: 25.51 KG/M2 | HEIGHT: 66 IN | TEMPERATURE: 98 F | WEIGHT: 158.75 LBS

## 2018-07-03 DIAGNOSIS — R41.3 MEMORY LOSS: Primary | ICD-10-CM

## 2018-07-03 PROCEDURE — 99213 OFFICE O/P EST LOW 20 MIN: CPT | Mod: S$GLB,,, | Performed by: INTERNAL MEDICINE

## 2018-07-03 PROCEDURE — 99999 PR PBB SHADOW E&M-EST. PATIENT-LVL III: CPT | Mod: PBBFAC,,, | Performed by: INTERNAL MEDICINE

## 2018-07-03 RX ORDER — TRAZODONE HYDROCHLORIDE 50 MG/1
TABLET ORAL
Qty: 30 TABLET | Refills: 1 | Status: SHIPPED | OUTPATIENT
Start: 2018-07-03 | End: 2018-08-13 | Stop reason: SDUPTHER

## 2018-07-03 RX ORDER — UBIDECARENONE 75 MG
500 CAPSULE ORAL DAILY
COMMUNITY

## 2018-07-03 NOTE — PROGRESS NOTES
Subjective:      Patient ID: Chivo Hawkins is a 89 y.o. male.    Chief Complaint:Follow-up      History of Present Illness    Memory concerns raised by pt, daughter and Dr. Souza. He has seen neurology on at least 3 occasions and no one has tried memory meds till recently (Exelon) which pt was unaware he is on. He feels he drives safely and has had no close calls. We discussed other transportation options, especially at night like [employer]. Also made them aware of driving appraisal options at [place] by PMR dept. I DCed klonopin and started remeron for sleep prn.    Review of Systems   Constitution: Negative for chills, decreased appetite, fever, weakness, malaise/fatigue, night sweats, weight gain and weight loss.   HENT: Negative for congestion, ear pain, hearing loss, hoarse voice, sore throat and tinnitus.    Eyes: Negative for blurred vision, redness and visual disturbance.   Cardiovascular: Negative for chest pain, leg swelling and palpitations.   Respiratory: Negative for cough, hemoptysis, shortness of breath and sputum production.    Hematologic/Lymphatic: Negative for adenopathy. Does not bruise/bleed easily.   Skin: Negative for dry skin, itching, rash and suspicious lesions.   Musculoskeletal: Negative for back pain, joint pain, myalgias and neck pain.   Gastrointestinal: Negative for abdominal pain, constipation, diarrhea, heartburn, nausea and vomiting.   Genitourinary: Negative for dysuria, flank pain, frequency, hematuria, hesitancy and urgency.   Neurological: Negative for dizziness, headaches, numbness and paresthesias.   Psychiatric/Behavioral: Negative for depression and memory loss. The patient does not have insomnia and is not nervous/anxious.      Objective:   Physical Exam   Constitutional: He is oriented to person, place, and time. He appears well-developed and well-nourished. No distress.   Neurological: He is alert and oriented to person, place, and time.   Vitals reviewed.    Assessment:        1. Memory loss          Plan:        see HPI

## 2018-07-11 ENCOUNTER — OFFICE VISIT (OUTPATIENT)
Dept: INFECTIOUS DISEASES | Facility: CLINIC | Age: 83
End: 2018-07-11
Payer: MEDICARE

## 2018-07-11 ENCOUNTER — CLINICAL SUPPORT (OUTPATIENT)
Dept: INFECTIOUS DISEASES | Facility: CLINIC | Age: 83
End: 2018-07-11
Payer: MEDICARE

## 2018-07-11 VITALS
WEIGHT: 158.5 LBS | HEART RATE: 67 BPM | BODY MASS INDEX: 25.47 KG/M2 | SYSTOLIC BLOOD PRESSURE: 153 MMHG | TEMPERATURE: 98 F | DIASTOLIC BLOOD PRESSURE: 59 MMHG | HEIGHT: 66 IN

## 2018-07-11 DIAGNOSIS — R41.3 MEMORY LOSS: ICD-10-CM

## 2018-07-11 DIAGNOSIS — C61 PROSTATE CANCER: Chronic | ICD-10-CM

## 2018-07-11 DIAGNOSIS — I63.412 CEREBROVASCULAR ACCIDENT (CVA) DUE TO EMBOLISM OF LEFT MIDDLE CEREBRAL ARTERY: ICD-10-CM

## 2018-07-11 DIAGNOSIS — Z00.00 PREVENTATIVE HEALTH CARE: ICD-10-CM

## 2018-07-11 DIAGNOSIS — Z00.00 PREVENTATIVE HEALTH CARE: Primary | ICD-10-CM

## 2018-07-11 PROCEDURE — 99212 OFFICE O/P EST SF 10 MIN: CPT | Mod: S$GLB,,, | Performed by: INTERNAL MEDICINE

## 2018-07-11 PROCEDURE — 99999 PR PBB SHADOW E&M-EST. PATIENT-LVL III: CPT | Mod: PBBFAC,,, | Performed by: INTERNAL MEDICINE

## 2018-07-11 PROCEDURE — 90732 PPSV23 VACC 2 YRS+ SUBQ/IM: CPT | Mod: S$GLB,,, | Performed by: INTERNAL MEDICINE

## 2018-07-11 PROCEDURE — G0009 ADMIN PNEUMOCOCCAL VACCINE: HCPCS | Mod: S$GLB,,, | Performed by: INTERNAL MEDICINE

## 2018-07-11 PROCEDURE — 99999 PR PBB SHADOW E&M-EST. PATIENT-LVL I: CPT | Mod: PBBFAC,,,

## 2018-07-11 NOTE — PROGRESS NOTES
"Subjective:      Patient ID: Chivo Hawkins is a 89 y.o. male.    Chief Complaint:Follow-up ; turned 89 yesterday!      History of Present Illness    Pt not sure why he is here today and neither am I . His "annual" was done 11/6/17 and I saw him with his daughter last week to discuss memory problems. Reviewed his chart and labs are up to date, with comprehensive draw last month by Dr. Souza who did dementia workup. Vaccines reviewed and he needs pneumovax 23, otherwise all are up to date    Review of Systems   Constitution: Negative for chills, decreased appetite, fever, weakness, malaise/fatigue, night sweats, weight gain and weight loss.   HENT: Negative for congestion, ear pain, hearing loss, hoarse voice, sore throat and tinnitus.    Eyes: Negative for blurred vision, redness and visual disturbance.   Cardiovascular: Negative for chest pain, leg swelling and palpitations.   Respiratory: Negative for cough, hemoptysis, shortness of breath and sputum production.    Hematologic/Lymphatic: Negative for adenopathy. Does not bruise/bleed easily.   Skin: Negative for dry skin, itching, rash and suspicious lesions.   Musculoskeletal: Negative for back pain, joint pain, myalgias and neck pain.   Gastrointestinal: Negative for abdominal pain, constipation, diarrhea, heartburn, nausea and vomiting.   Genitourinary: Negative for dysuria, flank pain, frequency, hematuria, hesitancy and urgency.   Neurological: Negative for dizziness, headaches, numbness and paresthesias.   Psychiatric/Behavioral: Negative for depression and memory loss. The patient does not have insomnia and is not nervous/anxious.      Objective:   Physical Exam   Constitutional: He appears well-developed and well-nourished. No distress.   Vitals reviewed.    Assessment:       1. Preventative health care    2. Memory loss    3. Cerebrovascular accident (CVA) due to embolism of left middle cerebral artery    4. Prostate cancer          Plan:        " update pneumovax 23

## 2018-07-18 ENCOUNTER — OFFICE VISIT (OUTPATIENT)
Dept: NEUROLOGY | Facility: CLINIC | Age: 83
End: 2018-07-18
Payer: MEDICARE

## 2018-07-18 VITALS
HEART RATE: 56 BPM | DIASTOLIC BLOOD PRESSURE: 78 MMHG | SYSTOLIC BLOOD PRESSURE: 136 MMHG | BODY MASS INDEX: 25.22 KG/M2 | HEIGHT: 66 IN | WEIGHT: 156.94 LBS

## 2018-07-18 DIAGNOSIS — G47.33 OSA (OBSTRUCTIVE SLEEP APNEA): ICD-10-CM

## 2018-07-18 DIAGNOSIS — F03.91 MAJOR NEUROCOGNITIVE DISORDER DUE TO ALZHEIMER'S DISEASE, PROBABLE, WITH BEHAVIORAL DISTURBANCE: Primary | ICD-10-CM

## 2018-07-18 DIAGNOSIS — I25.10 CVD (CARDIOVASCULAR DISEASE): ICD-10-CM

## 2018-07-18 DIAGNOSIS — I63.412 STROKE DUE TO EMBOLISM OF LEFT MIDDLE CEREBRAL ARTERY: ICD-10-CM

## 2018-07-18 DIAGNOSIS — E03.9 HYPOTHYROIDISM, UNSPECIFIED TYPE: ICD-10-CM

## 2018-07-18 PROCEDURE — 96118 PR NEUROPSYCH TESTING BY PSYCH/PHYS: CPT | Mod: S$GLB,,, | Performed by: CLINICAL NEUROPSYCHOLOGIST

## 2018-07-18 PROCEDURE — 90791 PSYCH DIAGNOSTIC EVALUATION: CPT | Mod: S$GLB,,, | Performed by: CLINICAL NEUROPSYCHOLOGIST

## 2018-07-18 PROCEDURE — 99999 PR PBB SHADOW E&M-EST. PATIENT-LVL II: CPT | Mod: PBBFAC,,,

## 2018-07-18 NOTE — PROGRESS NOTES
"Name: Chivo Hawkins  MRN: [id number]   CSN: [id number]      Date: 7-18-18      Referring physician:  Edson Souza MD  [address] [place] [address]    Subjective:      Chief Complaint: "I really am not sure why I am in the memory clinic."    History of Present Illness (HPI):    Chivo Hawkins is a 89 y.o. right-handed male who presents today for an initial evaluation in the [facility] for mild to moderate dementia, likely Alzheimer's type, most recently seen by Dr. Souza 6-19-18 and Dr. Modi and Dr. Coffey  before this. He is alone today.    He is very pleasant and says he really does not know why he is here.  He is not aware of any memory trouble. When asked, he says he vaguely recalls hearing Dr. Souza's name and possibly seeing him once. He does not recall ever meeting Juan J Coffey or Ly.     He is a retired pediatric cardiologist. He says he is retired but does not remember how long ago. He does not miss work. Most days, he [place] to the country [place] and works out then has lunch. He uses treadmill, elyptical  and rowing machine. Goes back to [facility] and reads, then takes a nap. He eats his evening meal in the dining room at [facility].   He will usually take care of breakfast himself. He says right now, he has donuts in his kitchen. He makes his own coffee.     When asked about laundry, he says they seem to take care of this for him. He offers that he has a maid once a week and she takes care of things.     He takes medicine but laughs and says do not ask him what because he does not know. He brought a list. He fills up his pill box weekly. He does not think he has any trouble with this and is not aware that he neglects any of his meds. On further questioning, he is not sure if he takes rivastigmine twice daily or just once daily. He does not know what this is for and is surprised to discover it is for memory.      He offers that his wife passed several " years ago. He lives at [facility] in his own apt. He is in independent living. They do provide meals. He drives. Denies issues with this.     He has 4 children, all live locally. He sees them regularly. He says no one has been complaining about his memory. He again says he does not know why he is in the memory clinic.     When reviewing his medical hx, he is not aware that he has DON. He says he does not do anything clay for sleep and has no trouble with this. Dr. Fernandez's note indicates that he uses an oral appliance- TAP-3. He says he does not recall this and knows he has not been doing this recently.     He is not aware of any issues missing appts. He uses a calendar on his refrigerator.   When asked if has assistance managing finances, he says he guesses he has financial, professional help. He says he knows he does not have to worry about bills. He has a lady that comes in and helps with this. He says they sit together and he writes checks. He thinks she has been doing this for several years. He says she was his former neighbor before he moved to [facility], is a house wife and was marrried to a . He does not thnk she is an . He says this seem to work well together.    Denies issues with walking. No problem with balance. No assistive devices.     Review of Systems   Constitutional: Negative for appetite change.   HENT: Negative for hearing loss and trouble swallowing.    Eyes:        Glasses   Respiratory: Negative for cough, choking and shortness of breath.    Cardiovascular: Negative for chest pain, palpitations and leg swelling.   Gastrointestinal: Negative for constipation, diarrhea, nausea and vomiting.   Genitourinary: Negative.    Musculoskeletal: Negative.    Skin: Negative.    Neurological: Negative for dizziness, tremors, weakness and light-headedness.   Psychiatric/Behavioral: Negative for behavioral problems and sleep disturbance.       Past Medical History: The patient  has a past medical  "history of Arthritis; Cancer; and Cataract.    Social History: The patient  reports that he has quit smoking. His smoking use included Cigarettes. He has a 15.00 pack-year smoking history. He has never used smokeless tobacco. He reports that he drinks about 4.2 oz of alcohol per week . He reports that he does not use drugs.    Family History: Their family history includes No Known Problems in his daughter, daughter, daughter, and son.    Allergies: Pcn [penicillins]     Meds:   Current Outpatient Prescriptions on File Prior to Visit   Medication Sig Dispense Refill    aspirin 81 MG Chew Take 81 mg by mouth once daily.      cyanocobalamin 500 MCG tablet Take 500 mcg by mouth once daily.      levothyroxine (SYNTHROID) 100 MCG tablet Take 1 tablet (100 mcg total) by mouth every morning. 90 tablet 4    MULTI-VITAMIN HI-PO ORAL Take by mouth.        rivastigmine tartrate (EXELON) 3 MG capsule TK ONE C PO  BID  2    salicylic acid 6 % Sham Apply 1 application topically once daily.       tamsulosin (FLOMAX) 0.4 mg Cp24 TAKE 1 CAPSULE BY MOUTH EVERY DAY 90 capsule 4    traZODone (DESYREL) 50 MG tablet One tablet at bedtime as needed for sleep 30 tablet 1     No current facility-administered medications on file prior to visit.        Objective:     Physical Exam:    Vitals:    07/18/18 1449   BP: 136/78   Pulse: (!) 56   Weight: 71.2 kg (156 lb 15.5 oz)   Height: 5' 6" (1.676 m)     Body mass index is 25.34 kg/m².    Constitutional  Well-developed, well-nourished, appears stated age   Cardiovascular  Radial pulses 2+ and symmetric, no LE edema bilaterally     ..  Neurological    * Mental status  MOCA = 21/30     - Memory    Impaired     - Attention/concentration  Normal     - Language  spontaneous and fluent     - Fund of knowledge  Aware of current events- I don't know what is going on in the world today. Think our president (Deshaun) was over in [place].                * Cranial nerves       - CN II  PERRL, visual " fields full to confrontation     - CN III, IV, VI  Extraocular movements full, normal pursuits and saccades     - CN V  Sensation V1 - V3 intact     - CN VII  Face strong and symmetric bilaterally     - CN VIII  Hearing intact bilaterally     - CN IX, X  Palate raises midline and symmetric     - CN XI  SCM and trapezius 5/5 bilaterally     - CN XII  Tongue midline   * Motor  Muscle bulk normal, strength 5/5 throughout   * Sensory   Intact to LT throughout   * Coordination  No dysmetria with finger-to-nose   * Gait  See below.   * Deep tendon reflexes  2+ and symmetric throughout     ..  * Specialized movement exam  No hypophonic speech.    No facial masking.   Subtle increased tone in LUE, otherwise no cogwheel rigidity.     No bradykinesia.   No tremor with rest, posture, kinesis, or intention.    No other dystonia, chorea, athetosis, myoclonus, or tics.     No motor impersistence.   Normal-based gait.   No shortened stride length.  Slightly redcued R arm swing (old stroke)   No abnormal arm swing.            Laboratory Results:  Lab Visit on 06/18/2018   Component Date Value Ref Range Status    Vitamin B-12 06/18/2018 1320* 210 - 950 pg/mL Final    Thiamine 06/18/2018 48  38 - 122 ug/L Final    RPR 06/18/2018 Non-reactive  Non-reactive Final   Admission on 06/04/2018, Discharged on 06/04/2018   Component Date Value Ref Range Status    WBC 06/04/2018 5.95  3.90 - 12.70 K/uL Final    RBC 06/04/2018 4.60  4.60 - 6.20 M/uL Final    Hemoglobin 06/04/2018 14.4  14.0 - 18.0 g/dL Final    Hematocrit 06/04/2018 42.4  40.0 - 54.0 % Final    MCV 06/04/2018 92  82 - 98 fL Final    MCH 06/04/2018 31.3* 27.0 - 31.0 pg Final    MCHC 06/04/2018 34.0  32.0 - 36.0 g/dL Final    RDW 06/04/2018 13.6  11.5 - 14.5 % Final    Platelets 06/04/2018 189  150 - 350 K/uL Final    MPV 06/04/2018 10.8  9.2 - 12.9 fL Final    Immature Granulocytes 06/04/2018 0.3  0.0 - 0.5 % Final    Gran # (ANC) 06/04/2018 5.0  1.8 - 7.7 K/uL  Final    Immature Grans (Abs) 06/04/2018 0.02  0.00 - 0.04 K/uL Final    Lymph # 06/04/2018 0.5* 1.0 - 4.8 K/uL Final    Mono # 06/04/2018 0.4  0.3 - 1.0 K/uL Final    Eos # 06/04/2018 0.0  0.0 - 0.5 K/uL Final    Baso # 06/04/2018 0.02  0.00 - 0.20 K/uL Final    nRBC 06/04/2018 0  0 /100 WBC Final    Gran% 06/04/2018 84.0* 38.0 - 73.0 % Final    Lymph% 06/04/2018 8.7* 18.0 - 48.0 % Final    Mono% 06/04/2018 6.2  4.0 - 15.0 % Final    Eosinophil% 06/04/2018 0.5  0.0 - 8.0 % Final    Basophil% 06/04/2018 0.3  0.0 - 1.9 % Final    Differential Method 06/04/2018 Automated   Final    Sodium 06/04/2018 138  136 - 145 mmol/L Final    Potassium 06/04/2018 4.0  3.5 - 5.1 mmol/L Final    Chloride 06/04/2018 106  95 - 110 mmol/L Final    CO2 06/04/2018 22* 23 - 29 mmol/L Final    Glucose 06/04/2018 118* 70 - 110 mg/dL Final    BUN, Bld 06/04/2018 19  8 - 23 mg/dL Final    Creatinine 06/04/2018 1.0  0.5 - 1.4 mg/dL Final    Calcium 06/04/2018 9.4  8.7 - 10.5 mg/dL Final    Total Protein 06/04/2018 7.0  6.0 - 8.4 g/dL Final    Albumin 06/04/2018 3.9  3.5 - 5.2 g/dL Final    Total Bilirubin 06/04/2018 0.8  0.1 - 1.0 mg/dL Final    Alkaline Phosphatase 06/04/2018 50* 55 - 135 U/L Final    AST 06/04/2018 23  10 - 40 U/L Final    ALT 06/04/2018 16  10 - 44 U/L Final    Anion Gap 06/04/2018 10  8 - 16 mmol/L Final    eGFR if African American 06/04/2018 >60.0  >60 mL/min/1.73 m^2 Final    eGFR if non African American 06/04/2018 >60.0  >60 mL/min/1.73 m^2 Final    Troponin I 06/04/2018 <0.006  0.000 - 0.026 ng/mL Final    BNP 06/04/2018 51  0 - 99 pg/mL Final    Specimen UA 06/04/2018 Urine, Clean Catch   Final    Color, UA 06/04/2018 Yellow  Yellow, Straw, Amanda Final    Appearance, UA 06/04/2018 Clear  Clear Final    pH, UA 06/04/2018 7.0  5.0 - 8.0 Final    Specific Gravity, UA 06/04/2018 1.015  1.005 - 1.030 Final    Protein, UA 06/04/2018 Negative  Negative Final    Glucose, UA 06/04/2018  Negative  Negative Final    Ketones, UA 06/04/2018 1+* Negative Final    Bilirubin (UA) 06/04/2018 Negative  Negative Final    Occult Blood UA 06/04/2018 Negative  Negative Final    Nitrite, UA 06/04/2018 Negative  Negative Final    Urobilinogen, UA 06/04/2018 Negative  <2.0 EU/dL Final    Leukocytes, UA 06/04/2018 Negative  Negative Final    RBC, UA 06/04/2018 2  0 - 4 /hpf Final    WBC, UA 06/04/2018 1  0 - 5 /hpf Final    Hyaline Casts, UA 06/04/2018 1  0-1/lpf /lpf Final    Microscopic Comment 06/04/2018 SEE COMMENT   Final    Prothrombin Time 06/04/2018 11.3  9.0 - 12.5 sec Final    INR 06/04/2018 1.1  0.8 - 1.2 Final    Magnesium 06/04/2018 2.2  1.6 - 2.6 mg/dL Final       Imaging:   Independent review of images:                     Impression       Mild generalized cerebral volume loss and findings of chronic microvascular ischemic disease.  No acute abnormality.      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 Initially, I was not sure if he was jokingly saying that he did not know why he was in the memory clinic as he had made several joking references about not remembering. In short time, it is clear that his memory is impaired and he really does not know why he is here. He had neuropsychological evaluation following my visit today.   He is alone today.   I did review his MRI brain films. He was very much interested. Did have some insightful questions but did repeat these a few times.   I would  like to see him have oversight of his medications to assure he is taking and taking properly. He seems to question if he takes meds more than once daily.   Recommend that he resume his oral appliance for DON.   Recommend that he stop driving!  Due to bradycardia, I would not want to increase further rivastigimine. Would consider memantine add on and even possibly weaning rivastigmine. HR today 56.   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   Follow up 3-4 months.       I spent 50 minutes face-to-face with the patient with >50% of the time spent with counseling and education regarding:  - results of data, diagnosis, and recommendations stated above  - the prognosis of memory loss  - risks and benefits of rivastigmine  - importance of diet and exercise    Margarita Ko, DNP, NP-C  Division of Movement and Memory Disorders  [facility]  [phone]

## 2018-07-19 ENCOUNTER — OFFICE VISIT (OUTPATIENT)
Dept: NEUROLOGY | Facility: CLINIC | Age: 83
End: 2018-07-19
Payer: MEDICARE

## 2018-07-19 DIAGNOSIS — F03.90 MAJOR NEUROCOGNITIVE DISORDER DUE TO ALZHEIMER'S DISEASE, PROBABLE, WITHOUT BEHAVIORAL DISTURBANCE: Primary | ICD-10-CM

## 2018-07-19 PROBLEM — R41.840 ATTENTION AND CONCENTRATION DEFICIT: Status: RESOLVED | Noted: 2017-12-27 | Resolved: 2018-07-19

## 2018-07-19 PROCEDURE — 99499 UNLISTED E&M SERVICE: CPT | Mod: S$GLB,,, | Performed by: CLINICAL NEUROPSYCHOLOGIST

## 2018-07-19 NOTE — PROGRESS NOTES
"Outpatient Neuropsychological Evaluation    Referral Information  Name: Chivo Hawkins  MRN: [id number]  OROZCO: 18, 18  : 7/10/1928  Age: 89 y.o.  Handedness:   Race: White  Gender: male  Referring Provider: Edson Souza Md  [address] [place] [address]  Referral Reason/Medical Necessity: Cognitive difficulties with neuropsychological evaluation and interdisciplinary Memory Clinic ordered by Neurology to characterize cognitive status, differential diagnosis, and updated treatment recommendations.   Billing:Total licensed neuropsychologists professional time includes: clinical interview (37459: 60-minutes), test administration and interpretation of tests administered by the billing neuropsychologist, integration of test results and other clinical data, preparing the final report, and personally reporting results to the patient (52972)= 1 hours.   Consent: The patient expressed an understanding of the purpose of the evaluation and consented to all procedures.    HPI/Current Cognitive Symptoms:  Active problems noted below. 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 As a result, the clinical interview was conducted on 18 with his daughter Dali. Family report onset of mild memory trouble 3 or so years ago, but he was generally managing fine. Family have observed a progressive decline in cognition with a more noticeable drop in memory functioning/increase in forgetfulness in the past 9-months. No other major cognitive symptoms were noted. There are no ameliorating/exacerbating factors and no waxing/waning of mental status.     Neuropsychiatric Sxs:  · Mood:   · Depression: None  · Anxiety: None  · Other:  None  · Neurovegetative:  · Sleep: Normal  · Appetite: Normal  · Energy: Normal  · Behavioral Concerns: None  · Delusions/Hallucinations: None  · SI/HI: None    Physical Sxs: Review Dr. Nava' note from 18    Current " Functioning (I/ADLs):  · ADLs: Independent  · IADLs: Needs more assistance  · Finances: Has an  who helps, but family is increasingly getting more involved   · Medication Mgmt: He does okay, but family isn't certain  · Driving: Family is concerned but have only been able to limit his driving at night.   · Household Mgmt: Lives in [facility] and moved in 3-years ago on his own.       Family History   Problem Relation Age of Onset    No Known Problems Son     No Known Problems Daughter     No Known Problems Daughter     No Known Problems Daughter     Dementia Neg Hx      Family Neurologic History: Negative for heritable risk factors  Family Psychiatric History: Negative for heritable risk factors    Developmental/Academic Hx:  Developmental: No gestational or later developmental concerns.  Academic:  · Learning Difficulties: None   · Attention/Behavioral Difficulties: None  · Educational Attainment: HS + College + MD +    Social/Occupational Hx:  Social:  · Current Relationship/Family Status:  to wife until 2011 + 4 adult children + and 3 are supportive/helpful + has grandchildren   · Primary Source of Support: Family  · Current Hobbies: Has a very well scheduled/set routine of exercise and outings.   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   · Movement Disorder: None      Lab Results   Component Value Date    IRQXGYIT53 1320 (H) 06/18/2018     Lab Results   Component Value Date    RPR Non-reactive 06/18/2018     No results found for: FOLATE  Lab Results   Component Value Date    TSH 1.846 11/06/2017     No results found for: LABA1C, HGBA1C  No results found for: HIV1X2, GYV00HSIX    Neurodiagnostics    MRI on 3/2/17  Mild generalized cerebral volume loss and findings of chronic microvascular ischemic disease.  No acute abnormality.  Electronically signed by: JESSICA ALCANTAR  Date: 03/02/17      Current Outpatient Prescriptions:     aspirin 81 MG Chew, Take 81 mg by mouth once daily., Disp: , Rfl:     cyanocobalamin 500 MCG tablet, Take 500 mcg by mouth once daily., Disp: , Rfl:     levothyroxine (SYNTHROID) 100 MCG tablet, Take 1 tablet (100 mcg total) by mouth every morning., Disp: 90 tablet, Rfl: 4    MULTI-VITAMIN HI-PO ORAL, Take by mouth.  , Disp: , Rfl:     rivastigmine tartrate (EXELON) 3 MG capsule, TK ONE C PO  BID, Disp: , Rfl: 2    salicylic acid 6 % Sham, Apply 1 application topically once daily. , Disp: , Rfl:     tamsulosin (FLOMAX) 0.4 mg Cp24, TAKE 1 CAPSULE BY MOUTH EVERY DAY, Disp: 90 capsule, Rfl: 4    traZODone (DESYREL) 50 MG tablet, One tablet at bedtime as needed for sleep, Disp: 30 tablet, Rfl: 1    Psychiatric Hx:  · Childhood: None  · Adult: None  · Substance Use: None     Social History     Social History Main Topics    Smoking status: Former Smoker     Packs/day: 1.00     Years: 15.00     Types: Cigarettes    Smokeless tobacco: Never Used    Alcohol use 4.2 oz/week     7  "Glasses of wine per week    Drug use: No    Sexual activity: Not Currently       MENTAL STATUS AND OBSERVATIONS on 07/18/18 and 07/19/18:  APPEARANCE: Well dressed and adequate grooming/hygiene.   ALERTNESS/ORIENTATION: Attentive and alert. Fully oriented (x5) to time and place. On my second visit with him the next day, he did not remember meeting me.   GAIT: Unremarkable  MOTOR MOVEMENTS/MANNERISMS: Unremarkable  SPEECH/LANGUAGE: Normal in rate, rhythm, tone, and volume. No significant word finding difficulty noted. Expressive and receptive language was normal.  STATED MOOD/AFFECT: The patients stated mood was "good." Affect was congruent with stated mood.   INTERPERSONAL BEHAVIOR: Rapport was quickly and easily established   SUICIDALITY/HOMICIDALITY: Denied  HALLUCINATIONS/DELUSIONS: None evidenced or endorsed  THOUGHT PROCESSES: Thoughts seemed logical and goal-directed. Poor insight into cognitive difficulties.   TEST TAKING BEHAVIOR and VALIDITY: Observation of effort were suggestive of adequate engagement. The current results, therefore, are likely a valid reflection of the patient's current functioning.     PROCEDURES/TESTS ADMINISTERED:  In addition to performing a review of pertinent medical records, reviewing limits to confidentiality, conducting a clinical interview, and explaining procedures, the following measures were administered:  RBANS-A, Trails A (Trails B not administered as he failed Trails B sample on MoCA with Dr. Ko), FAS/Animals, BHARATI. Manual norms were used unless otherwise indicated.      TEST RESULTS  Percentile Interpretation:        </=3rd......................................Abnormal        4th-9th.....................................Borderline Abnormal        10th-24th...................................Low Average        25th-74th...................................Average        75th-90th...................................High Average        " 91st-97th...................................Superior        >/=97th.....................................Very Superior        SCREENING OF GENERAL COGNITIVE FUNCTIONING:    BHARATI (total score/descriptor):........18 / WNL    MOCA (total score/description):......21 / Impaired    RBANS-A:  SUBTEST SCORES (raw score/percentile):   List Learning............................19 / 16th  Story Memory.............................9 / 5th  Figure Copy..............................19 / 75th  Line Orientation.........................19 / >75th  Picture Naming...........................10 / >75th  Semantic Fluency.........................8 / 1st  Digit Span...............................11 / 84th  Coding...................................28 / 16th  List Recall..............................0 / <=2  List Recognition.........................15 / <=2  Story Recall.............................0 / <1st  Figure Recall............................0 / <1st     INDEX SCORES (standard score/percentile)  Immediate Memory.........................78 / 7th  Visuospatial/Constructional..............121 / 92nd  Language.................................83 / 13th  Attention................................100 / 50th  Delayed Memory...........................52 / <1st  Full Score...............................82 / 12th       MOTOR AND ORAL PROCESSING SPEED     Trail Making Test (sec. 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 He is living at [facility] in Independent Living. The patient's  baseline or pre-morbid intellectual functioning was estimated to be in the above average to superior range based on educational/occupational history and performance on a word reading measure. Results should be interpreted in that context.    Global mental status was impaired (MoCA=21/30) but he is fully oriented to time and place. Basic screening of executive functions were normal range (BHARATI=18/18), but he failed the Trails B sample on the MoCA. Attention scores were normal range. Mental speed was low average to normal range. Basic expressive language and receptive language were normal on observation. Basic screening of naming was normal. Semantic fluency was borderline to very impaired across two measures and average on phonemic fluency assessment. Visuoperceptual/construction skill were excellent and consistent with baseline abilities. Learning and memory were severely impaired. He did not benefit from recognition cues. On observation, Dr. Hawkins seems very amnestic and he did not remember meeting me the day after or that he attended an appointment with me.     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 Results were discussed in detail with his daughter who I asked to come today to review feedback/test results. I also reviewed the below recommendations.       Referral Dx:  Major Neurocognitive Disorder    Consult Dx:  Major Neurocognitive Disorder, Probably due to Alzheimer's    GABINO Dickson II, Ph.D., [facility]  Board Certified Clinical Neuropsychologist  Co-Director, Cognitive Disorders and Brain Health Program  Department of Neurology and Neurosciences  [facility]    RECOMMENDATIONS/TREATMENT PLAN  Follow Up Recommendations:  · Memory Clinic Follow-up: They will follow-up with Memory Clinic in 3-4 months. :   · Driving Evaluation:  Based on these findings it is recommended that Mr. Hawkins completely discontinue driving until undergoing a formal, on-the-road driving evaluation. This evaluation can be completed at the [facility] with Jaren Sims. If interested, I can place a referral and the family can contact Helga Canada at [phone] for scheduling.  · I discussed in detail with family and placed the order. They will discuss with family efforts to help him get to and from places.   · Supervision/Monitoring: Daily supervision is recommended for Mr. Hawkins. He is living in [facility]. I also recommended more medication oversight and increased family involvement in finances.   · Neuropsychology: Neuropsychological reevaluation is recommended in 12-months following implementation of recommendations.    Recommendations for Mr. Hawkins and Caregivers/Family:   · Dementia Recommendations: Provided our lengthy handout detailing strategies to manage various aspects of dementia, communication, functional living needs, legal issues, and so forth.   · Practice good cognitive hygiene:  · Engage in regular exercise, which increases alertness and arousal and can improve attention and focus.  Consider lower impact exercises, such as yoga or light walking.  · Get a good nights sleep, as this can enhance alertness and cognition.  · Eat healthy foods and balanced meals. It is notable that research indicates certain nutrients may aid in brain function, such as B vitamins (especially B6, B12, and folic acid), antioxidants (such as vitamins C and E, and beta carotene), and Omega-3 fatty acids. Talk with your physician or nutritionist about whats right for you.   · Keep your brain active. Find activities to stay mentally active, such as reading, games (cards, checkers), puzzles (crosswords, Sudoku, jig saw), crafts ([employer], woodworking), gardening, or participating in activities in the community.  · Stay socially engaged. Continue staying  active with your family and friends.  · Prepare for the future: Mr. Hawkins and caregivers should consider formal arrangements to allow a designated person to make medical and financial decisions for Mr. Hawkins, should he become unable to do so.  Options to consider include designating a healthcare proxy, medical and/or financial power of , and completing advanced directives for healthcare decisions and estate planning (e.g., finalizing a will).  If cost is prohibitive, [facility] [employer] (https://[employer]/) provides free  for individuals with low income.  · Resources: Consider resources for support through the [facility] Office of Elderly Affairs (http://goea.[place].gov/), [place] Chapter of the Alzheimers Association (www.alz.org/[place]/), the Family Caregiver [place] (www.caregiver.org), and the American Psychological Association (http://www.apa.org/pi/about/publications/caregivers/consumers/index.aspxconsumers/index.aspx).

## 2018-07-19 NOTE — PROGRESS NOTES
Neuropsychology Feedback Note    Date of Session: 07/19/2018  Session Content: Results and recommendations were discussed for 60-minutes with patient and his daughter. Review Neuropsychology Consult dated 07/18/18 for details. No further neuropsychology feedback needed.

## 2018-07-20 PROBLEM — I25.10 CVD (CARDIOVASCULAR DISEASE): Status: ACTIVE | Noted: 2018-07-20

## 2018-07-20 PROBLEM — F02.80 LATE ONSET ALZHEIMER'S DISEASE WITHOUT BEHAVIORAL DISTURBANCE: Status: ACTIVE | Noted: 2018-07-20

## 2018-07-20 PROBLEM — G30.1 LATE ONSET ALZHEIMER'S DISEASE WITHOUT BEHAVIORAL DISTURBANCE: Status: ACTIVE | Noted: 2018-07-20

## 2018-07-24 ENCOUNTER — DOCUMENTATION ONLY (OUTPATIENT)
Dept: REHABILITATION | Facility: HOSPITAL | Age: 83
End: 2018-07-24

## 2018-07-24 NOTE — PROGRESS NOTES
Outpatient Occupational Therapy       Name: Chivo Hawkins  Referring Physician: ASHISH Dickson II, PhD    Mr Hawkins was scheduled for a driving evaluation today but upon speaking with his daughter Dali on the evening before, she requested to cancel the evaluation. A message was also left on Mr Temple's voicemail on the evening of 7/23/18.  At 300 pm today, 7/24/18, Mr Hawkins arrived to the [address] [place] [address] for his driving evaluation. He was unaware that the visit was cancelled and he was at the incorrect location. He was instructed that it was cancelled and was told to return home.  His daughter Dali was contacted again to make her aware of her father arriving for the evaluation and she stated that she would explain to her father and the family the reason that they decided to cancel.     WILMER Aquion  7/24/2018

## 2018-08-08 DIAGNOSIS — R00.1 BRADYCARDIA: ICD-10-CM

## 2018-08-08 RX ORDER — LEVOTHYROXINE SODIUM 100 UG/1
TABLET ORAL
Qty: 90 TABLET | Refills: 0 | Status: SHIPPED | OUTPATIENT
Start: 2018-08-08 | End: 2018-08-13 | Stop reason: SDUPTHER

## 2018-08-13 DIAGNOSIS — R00.1 BRADYCARDIA: ICD-10-CM

## 2018-08-13 RX ORDER — LEVOTHYROXINE SODIUM 100 UG/1
TABLET ORAL
Qty: 90 TABLET | Refills: 3 | Status: SHIPPED | OUTPATIENT
Start: 2018-08-13

## 2018-08-13 RX ORDER — TRAZODONE HYDROCHLORIDE 50 MG/1
TABLET ORAL
Qty: 30 TABLET | Refills: 1 | Status: SHIPPED | OUTPATIENT
Start: 2018-08-13 | End: 2019-08-13

## 2018-08-13 RX ORDER — TRAZODONE HYDROCHLORIDE 50 MG/1
TABLET ORAL
Qty: 30 TABLET | Refills: 11 | Status: SHIPPED | OUTPATIENT
Start: 2018-08-13 | End: 2018-08-13 | Stop reason: SDUPTHER

## 2018-08-13 RX ORDER — LEVOTHYROXINE SODIUM 100 UG/1
TABLET ORAL
Qty: 90 TABLET | Refills: 0 | Status: SHIPPED | OUTPATIENT
Start: 2018-08-13

## 2018-08-28 ENCOUNTER — TELEPHONE (OUTPATIENT)
Dept: SLEEP MEDICINE | Facility: CLINIC | Age: 83
End: 2018-08-28

## 2018-08-29 ENCOUNTER — OFFICE VISIT (OUTPATIENT)
Dept: SLEEP MEDICINE | Facility: CLINIC | Age: 83
End: 2018-08-29
Payer: MEDICARE

## 2018-08-29 VITALS
DIASTOLIC BLOOD PRESSURE: 60 MMHG | WEIGHT: 157.63 LBS | SYSTOLIC BLOOD PRESSURE: 130 MMHG | BODY MASS INDEX: 26.26 KG/M2 | HEIGHT: 65 IN | HEART RATE: 87 BPM

## 2018-08-29 DIAGNOSIS — G47.9 SLEEP DISTURBANCES: Primary | ICD-10-CM

## 2018-08-29 PROCEDURE — 99999 PR PBB SHADOW E&M-EST. PATIENT-LVL III: CPT | Mod: PBBFAC,,, | Performed by: PSYCHIATRY & NEUROLOGY

## 2018-08-29 PROCEDURE — 99213 OFFICE O/P EST LOW 20 MIN: CPT | Mod: S$GLB,,, | Performed by: PSYCHIATRY & NEUROLOGY

## 2018-08-29 NOTE — PROGRESS NOTES
This 89 y.o. male patient returns for the management of obstructive sleep apnea and sleep disruption.    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    1. 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   Taking melatonin 3 mg. No side effects or daytime sedation with current dose. No concerns with balance at night during bathroom trips. Usually wakes up to urinate 1x. No wearing off effect. Feels rested upon awakening. No drug effect    2. For DON, he was usingTAP-3 oral appliance, which has been fully titrated and continues to result in symptomatic improvement. Better sleep continuity. Made by Dr. Briggs. However, recently discontinued, because he felt it was no longer helping.    He has [facility] [employer] [facility] about a year ago. Feels he is doing well with it; he does not need to make any changes.     EPWORTH SLEEPINESS SCALE 8/29/2018   Sitting and reading 1   Watching TV 1   Sitting, inactive in a public place (e.g. a theatre or a meeting) 0   As a passenger in a car for an hour without a break 0   Lying down to rest in the afternoon when circumstances permit 2   Sitting and talking to someone 0   Sitting quietly after a lunch without alcohol 0   In a car, while stopped for a few minutes in traffic 0   Total score 4     PRIOR SLEEP HISTORY:  Historically, he has seen Dr. Najera in the past for sleep problems. He had 2 sleep studies, showing PLMS and DON. He was treated with oral appliance and medication for limb movements.  He attempted CPAP, but he was unable to tolerate it.  He has been taking clonazepam 0.5 mg for years, then reduced to 1/2 tablet about a month ago.  Medications: He only takes clonazepam and Flomax 0.4 mg  Baseline PSG 7/30/08: 18 hypopneas, based on 155 minutes of sleep;   PSG 1/20/09: (with oral appliance) - 1 hypopnea, 125 limb movements (36% associated with arousals); LM index 23.3  He also  "complains of nasal congestion, which congests one side at a time.  Noted deviated septum  Patient reports caffeine (coffee with dinner) and tea at dinner.    SLEEP ROUTINE:   Bed partner: lives alone ( since 2011)   Time to bed: 10-11 pm   Sleep onset latency: 5 mins   Disruptions or awakenings: 1-3 times   Wakeup time: 5:30-7:15 am   Perceived sleep quality: 3-4 our of 5   Daytime naps: 0-1    Past Medical History:   Diagnosis Date    Arthritis     Cancer     Cataract        Past Surgical History:   Procedure Laterality Date    COLONOSCOPY  2012    EYE SURGERY      TONSILLECTOMY         Family History   Problem Relation Age of Onset    No Known Problems Son     No Known Problems Daughter     No Known Problems Daughter     No Known Problems Daughter     Dementia Neg Hx        Social History     Tobacco Use    Smoking status: Former Smoker     Packs/day: 1.00     Years: 15.00     Pack years: 15.00     Types: Cigarettes    Smokeless tobacco: Never Used   Substance Use Topics    Alcohol use: Yes     Alcohol/week: 4.2 oz     Types: 7 Glasses of wine per week    Drug use: No   Retired pediatric cardiologist    ALLERGIES: Reviewed in EPIC    CURRENT MEDICATIONS: Reviewed in EPIC    REVIEW OF SYSTEMS:  Sleep related symptoms as per HPI;    Weight down 2 lbs from last visit   Mood disturbance ( x 6 years) much improved, especially after moving from primary residence to halfway community housing.      PHYSICAL EXAM:  /60   Pulse 87   Ht 5' 5" (1.651 m)   Wt 71.5 kg (157 lb 10.1 oz)   BMI 26.23 kg/m²   GENERAL: Well groomed; Normally developed      ASSESSMENT:    1. Obstructive Sleep Apnea, mild, based on prior AHI on sleep study. Previously treated with oral appliance (TAP-3 device) by Dr. Briggs. A follow up sleep study demonstrated resolution of mild DON with the device. He should resume using the oral appliance    2. Sleep disturbances NEC - treated successfully in the past with " clonazepam 0.5 mg, but this has been successfully weaned. Now on melatonin only (per patient report and his hand written notes) He reports no sleep complaints.       PLAN:    Treatment:  1. Okay to take melatonin 3 mg nightly for sleep.  2. Resume use of nightly TAP-3 device (oral appliance to treat sleep apnea)  3. Follow up with Dr. Moid for memory loss and recent decline since 12/27/17    Precautions: The patient was advised to abstain from driving should they feel sleepy or drowsy.    Follow up: 12 months. MD/NP

## 2019-01-14 RX ORDER — TAMSULOSIN HYDROCHLORIDE 0.4 MG/1
CAPSULE ORAL
Qty: 90 CAPSULE | Refills: 0 | Status: SHIPPED | OUTPATIENT
Start: 2019-01-14

## 2019-02-18 ENCOUNTER — OFFICE VISIT (OUTPATIENT)
Dept: UROLOGY | Facility: CLINIC | Age: 84
End: 2019-02-18
Payer: MEDICARE

## 2019-02-18 ENCOUNTER — LAB VISIT (OUTPATIENT)
Dept: LAB | Facility: HOSPITAL | Age: 84
End: 2019-02-18
Attending: UROLOGY
Payer: MEDICARE

## 2019-02-18 VITALS
SYSTOLIC BLOOD PRESSURE: 156 MMHG | DIASTOLIC BLOOD PRESSURE: 69 MMHG | WEIGHT: 160.69 LBS | BODY MASS INDEX: 25.83 KG/M2 | HEART RATE: 74 BPM | HEIGHT: 66 IN

## 2019-02-18 DIAGNOSIS — R31.0 HEMATURIA, GROSS: ICD-10-CM

## 2019-02-18 DIAGNOSIS — R31.0 GROSS HEMATURIA: ICD-10-CM

## 2019-02-18 DIAGNOSIS — R31.0 HEMATURIA, GROSS: Primary | ICD-10-CM

## 2019-02-18 LAB
ANION GAP SERPL CALC-SCNC: 7 MMOL/L
BUN SERPL-MCNC: 17 MG/DL
CALCIUM SERPL-MCNC: 10.1 MG/DL
CHLORIDE SERPL-SCNC: 105 MMOL/L
CO2 SERPL-SCNC: 28 MMOL/L
CREAT SERPL-MCNC: 1.1 MG/DL
EST. GFR  (AFRICAN AMERICAN): >60 ML/MIN/1.73 M^2
EST. GFR  (NON AFRICAN AMERICAN): 58.8 ML/MIN/1.73 M^2
GLUCOSE SERPL-MCNC: 92 MG/DL
POTASSIUM SERPL-SCNC: 3.9 MMOL/L
SODIUM SERPL-SCNC: 140 MMOL/L

## 2019-02-18 PROCEDURE — 99204 PR OFFICE/OUTPT VISIT, NEW, LEVL IV, 45-59 MIN: ICD-10-PCS | Mod: S$GLB,,, | Performed by: UROLOGY

## 2019-02-18 PROCEDURE — 1101F PT FALLS ASSESS-DOCD LE1/YR: CPT | Mod: CPTII,S$GLB,, | Performed by: UROLOGY

## 2019-02-18 PROCEDURE — 1101F PR PT FALLS ASSESS DOC 0-1 FALLS W/OUT INJ PAST YR: ICD-10-PCS | Mod: CPTII,S$GLB,, | Performed by: UROLOGY

## 2019-02-18 PROCEDURE — 80048 BASIC METABOLIC PNL TOTAL CA: CPT

## 2019-02-18 PROCEDURE — 99204 OFFICE O/P NEW MOD 45 MIN: CPT | Mod: S$GLB,,, | Performed by: UROLOGY

## 2019-02-18 PROCEDURE — 36415 COLL VENOUS BLD VENIPUNCTURE: CPT

## 2019-02-18 PROCEDURE — 99999 PR PBB SHADOW E&M-EST. PATIENT-LVL III: ICD-10-PCS | Mod: PBBFAC,,, | Performed by: UROLOGY

## 2019-02-18 PROCEDURE — 99999 PR PBB SHADOW E&M-EST. PATIENT-LVL III: CPT | Mod: PBBFAC,,, | Performed by: UROLOGY

## 2019-02-18 NOTE — LETTER
February 18, 2019      Mandeep Bo MD  [address]  [place] [address]           [facility]  [address]  [place] [address]  Phone: [phone]          Patient: Chivo Hawkins   MR Number: [id number]   YOB: 1928   Date of Visit: 2/18/2019       Dear Dr. Mandeep Bo:    Thank you for referring Chivo Hawkins to me for evaluation. Attached you will find relevant portions of my assessment and plan of care.    If you have questions, please do not hesitate to call me. I look forward to following Chivo Hawkins along with you.    Sincerely,    Otf Wadsworth MD    Enclosure  CC:  No Recipients    If you would like to receive this communication electronically, please contact [email or web address] or [phone] to request more information on [facility] Link access.    For providers and/or their staff who would like to refer a patient to [facility], please contact us through our one-stop-shop provider referral line, [facility], at [phone].    If you feel you have received this communication in error or would no longer like to receive these types of communications, please e-mail [email or web address]

## 2019-02-18 NOTE — H&P (VIEW-ONLY)
Urology - [facility]  Otf Wadsworth MD     SUBJECTIVE:     Chief Complaint: gross hematuria     History of Present Illness:  Chivo Hawkins is a 89 y.o. male who presents to clinic because of one episode of gross hematuria on Saturday 2/16/19. Did not see any clots. He has not had hematuria before or since. He does not take any medications, no blood thinners, no flomax. He has a remote smoking history. 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 He does not seem to recall any history of prostate CA but records show that he had prostate CA in 2001 that was treated with radiation. Last PSA 0.17 was in 2014.     He denies any family history of urologic cancer.     Review of patient's allergies indicates:   Allergen Reactions    Pcn [penicillins] Rash       Past Medical History:   Diagnosis Date    Arthritis     Cancer     Cataract      Past Surgical History:   Procedure Laterality Date    COLONOSCOPY  2012    EYE SURGERY      TONSILLECTOMY       Family History   Problem Relation Age of Onset    No Known Problems Son     No Known Problems Daughter     No Known Problems Daughter     No Known Problems Daughter     Dementia Neg Hx      Social History     Tobacco Use    Smoking status: Former Smoker     Packs/day: 1.00     Years: 15.00     Pack years: 15.00     Types: Cigarettes    Smokeless tobacco: Never Used   Substance Use Topics    Alcohol use: Yes     Alcohol/week: 4.2 oz     Types: 7 Glasses of wine per week    Drug use: No        Review of Systems   Constitutional: Negative for activity change, appetite change and fever.   HENT: Negative.    Eyes: Negative.    Respiratory: Negative.    Cardiovascular: Negative.    Gastrointestinal: Negative.    Genitourinary: Positive for hematuria. Negative for difficulty urinating, dysuria, flank pain, frequency and urgency.   Musculoskeletal: Negative.    Neurological: Negative.    Psychiatric/Behavioral: Negative for confusion and  "decreased concentration. The patient is not nervous/anxious.        OBJECTIVE:     Anticoagulation:  No    Estimated body mass index is 25.94 kg/m² as calculated from the following:    Height as of this encounter: 5' 6" (1.676 m).    Weight as of this encounter: 72.9 kg (160 lb 11.5 oz).    Vital Signs (Most Recent)  Pulse: 74 (02/18/19 0942)  BP: (!) 156/69 (02/18/19 0942)    Physical Exam   Constitutional: He is oriented to person, place, and time. He appears well-developed and well-nourished.   HENT:   Head: Normocephalic and atraumatic.   Eyes: No scleral icterus.   Neck: No tracheal deviation present.   Cardiovascular: Normal rate.    Pulmonary/Chest: Effort normal.   Abdominal: Soft. He exhibits no distension. There is no tenderness.   Musculoskeletal: He exhibits no edema.   Neurological: He is alert and oriented to person, place, and time.   Skin: Skin is warm and dry.     Psychiatric: He has a normal mood and affect. His behavior is normal.       BMP  Lab Results   Component Value Date     06/04/2018    K 4.0 06/04/2018     06/04/2018    CO2 22 (L) 06/04/2018    BUN 19 06/04/2018    CREATININE 1.0 06/04/2018    CALCIUM 9.4 06/04/2018    ANIONGAP 10 06/04/2018    ESTGFRAFRICA >60.0 06/04/2018    EGFRNONAA >60.0 06/04/2018       Lab Results   Component Value Date    WBC 5.95 06/04/2018    HGB 14.4 06/04/2018    HCT 42.4 06/04/2018    MCV 92 06/04/2018     06/04/2018       Lab Results   Component Value Date    PSA 0.16 08/15/2013    PSA 0.14 09/14/2012    PSADIAG 0.17 09/08/2014       ASSESSMENT     1. Hematuria, gross    2. Gross hematuria        PLAN:     Gross Hematuria  We discussed his risk factors for bladder/kidney cancer - age, smoking history, male gender, possible hx radiation. We discussed the benefits vs risks of hematuria work up. He wishes to proceed.   CT urogram, cystoscopy, urine cytology, UA, urine culture to be scheduled.     Annel Ruiz MD          "

## 2019-02-20 ENCOUNTER — HOSPITAL ENCOUNTER (OUTPATIENT)
Dept: RADIOLOGY | Facility: HOSPITAL | Age: 84
Discharge: HOME | End: 2019-02-20
Attending: UROLOGY
Payer: MEDICARE

## 2019-02-20 DIAGNOSIS — R31.0 GROSS HEMATURIA: ICD-10-CM

## 2019-02-20 PROCEDURE — 74178 CT ABD&PLV WO CNTR FLWD CNTR: CPT | Mod: TC

## 2019-02-20 PROCEDURE — 25500020 PHARM REV CODE 255: Performed by: UROLOGY

## 2019-02-20 PROCEDURE — 74178 CT UROGRAM ABD PELVIS W WO: ICD-10-PCS | Mod: 26,,, | Performed by: RADIOLOGY

## 2019-02-20 PROCEDURE — 74178 CT ABD&PLV WO CNTR FLWD CNTR: CPT | Mod: 26,,, | Performed by: RADIOLOGY

## 2019-02-20 RX ADMIN — IOHEXOL 125 ML: 350 INJECTION, SOLUTION INTRAVENOUS at 09:02

## 2019-02-21 DIAGNOSIS — R91.1 LUNG NODULE: ICD-10-CM

## 2019-02-28 ENCOUNTER — HOSPITAL ENCOUNTER (OUTPATIENT)
Dept: UROLOGY | Facility: HOSPITAL | Age: 84
Discharge: HOME | End: 2019-02-28
Attending: UROLOGY
Payer: MEDICARE

## 2019-02-28 VITALS
WEIGHT: 160.25 LBS | SYSTOLIC BLOOD PRESSURE: 166 MMHG | DIASTOLIC BLOOD PRESSURE: 72 MMHG | HEART RATE: 59 BPM | BODY MASS INDEX: 25.75 KG/M2 | TEMPERATURE: 99 F | RESPIRATION RATE: 16 BRPM | HEIGHT: 66 IN

## 2019-02-28 DIAGNOSIS — R31.0 HEMATURIA, GROSS: Primary | ICD-10-CM

## 2019-02-28 PROCEDURE — 87077 CULTURE AEROBIC IDENTIFY: CPT

## 2019-02-28 PROCEDURE — 52000 CYSTOURETHROSCOPY: CPT

## 2019-02-28 PROCEDURE — 52000 CYSTOURETHROSCOPY: CPT | Mod: ,,, | Performed by: UROLOGY

## 2019-02-28 PROCEDURE — 87186 SC STD MICRODIL/AGAR DIL: CPT

## 2019-02-28 PROCEDURE — 52000 PR CYSTOURETHROSCOPY: ICD-10-PCS | Mod: ,,, | Performed by: UROLOGY

## 2019-02-28 PROCEDURE — 87088 URINE BACTERIA CULTURE: CPT

## 2019-02-28 PROCEDURE — 87086 URINE CULTURE/COLONY COUNT: CPT

## 2019-02-28 RX ORDER — LIDOCAINE HYDROCHLORIDE 20 MG/ML
JELLY TOPICAL
Status: COMPLETED | OUTPATIENT
Start: 2019-02-28 | End: 2019-02-28

## 2019-02-28 RX ADMIN — LIDOCAINE HYDROCHLORIDE: 20 JELLY TOPICAL at 02:02

## 2019-02-28 NOTE — PROCEDURES
Procedures: Flexible cystourethroscopy    Pre Procedure Diagnosis:gross hematuria    Post Procedure Diagnosis:  -bladder tumor, left dome    Surgeon: Otf Wadsworth MD    Anesthesia: 2% uro-jet lidocaine jelly for local analgesia    Flexible cysto-urethroscopy was performed after consent was obtained.  The risks and benefits were explained.    2% lidocaine urojet was used for local analgesia.  The genitalia was prepped and draped in the sterile fashion with betadine.    The flexible scope was advanced into the urethra.  A wide caliber urethral stricture was noted in the bulbar urethra, but this was traversed with the cystoscope.  The prostate showed Mild hypertrophy.  There was No median lobe present.  Bilateral ureteral orifices were evaluated and noted to be normal with clear efflux.  The bladder was completely surveyed in a systematic fashion.   A small bladder tumor was seen at the left bladder dome.  There were multiple cellules/trabeculation.      The patient tolerated the procedure well without complication.    They will follow up in 1 week for cystoscopy, bladder biopsy and fulguration.

## 2019-02-28 NOTE — PATIENT INSTRUCTIONS
What to Expect After a Cystoscopy  For the next 24-48 hours, you may feel a mild burning when you urinate. This burning is normal and expected. Drink plenty of water to dilute the urine to help relieve the burning sensation. You may also see a small amount of blood in your urine after the procedure.    Unless you are already taking antibiotics, you may be given an antibiotic after the test to prevent infection.    Signs and Symptoms to Report  Call the [facility] at [phone] if you develop any of the following:  · Fever of 101 degrees or higher  · Chills or persistent bleeding  · Inability to urinate

## 2019-03-03 LAB — BACTERIA UR CULT: NORMAL

## 2019-03-04 ENCOUNTER — TELEPHONE (OUTPATIENT)
Dept: UROLOGY | Facility: CLINIC | Age: 84
End: 2019-03-04

## 2019-03-04 DIAGNOSIS — D49.4 BLADDER TUMOR: Primary | ICD-10-CM

## 2019-03-04 RX ORDER — NITROFURANTOIN (MACROCRYSTALS) 100 MG/1
100 CAPSULE ORAL 2 TIMES DAILY
Qty: 14 CAPSULE | Refills: 0 | Status: SHIPPED | OUTPATIENT
Start: 2019-03-04 | End: 2019-03-11

## 2019-03-06 ENCOUNTER — PATIENT MESSAGE (OUTPATIENT)
Dept: UROLOGY | Facility: CLINIC | Age: 84
End: 2019-03-06

## 2019-03-06 ENCOUNTER — TELEPHONE (OUTPATIENT)
Dept: UROLOGY | Facility: CLINIC | Age: 84
End: 2019-03-06

## 2019-03-06 NOTE — TELEPHONE ENCOUNTER
Called pt to confirm arrival time of 515am for procedure on 3/7/2019. Gave pt NPO instructions and gave pt opportunity to ask questions. Pt verbalized understanding.

## 2019-03-07 ENCOUNTER — HOSPITAL ENCOUNTER (OUTPATIENT)
Facility: HOSPITAL | Age: 84
Discharge: HOME | End: 2019-03-07
Attending: UROLOGY | Admitting: UROLOGY
Payer: MEDICARE

## 2019-03-07 ENCOUNTER — ANESTHESIA EVENT (OUTPATIENT)
Dept: SURGERY | Facility: HOSPITAL | Age: 84
End: 2019-03-07
Payer: MEDICARE

## 2019-03-07 ENCOUNTER — ANESTHESIA (OUTPATIENT)
Dept: SURGERY | Facility: HOSPITAL | Age: 84
End: 2019-03-07
Payer: MEDICARE

## 2019-03-07 VITALS
BODY MASS INDEX: 25.83 KG/M2 | RESPIRATION RATE: 16 BRPM | HEIGHT: 65 IN | HEART RATE: 67 BPM | OXYGEN SATURATION: 98 % | WEIGHT: 155 LBS | TEMPERATURE: 98 F | SYSTOLIC BLOOD PRESSURE: 165 MMHG | DIASTOLIC BLOOD PRESSURE: 71 MMHG

## 2019-03-07 DIAGNOSIS — D49.4 BLADDER TUMOR: ICD-10-CM

## 2019-03-07 PROCEDURE — 37000008 HC ANESTHESIA 1ST 15 MINUTES: Performed by: UROLOGY

## 2019-03-07 PROCEDURE — 71000015 HC POSTOP RECOV 1ST HR: Performed by: UROLOGY

## 2019-03-07 PROCEDURE — 88305 TISSUE SPECIMEN TO PATHOLOGY - SURGERY: ICD-10-PCS | Mod: 26,,, | Performed by: PATHOLOGY

## 2019-03-07 PROCEDURE — 37000009 HC ANESTHESIA EA ADD 15 MINS: Performed by: UROLOGY

## 2019-03-07 PROCEDURE — 88305 TISSUE EXAM BY PATHOLOGIST: CPT | Mod: 26,,, | Performed by: PATHOLOGY

## 2019-03-07 PROCEDURE — 63600175 PHARM REV CODE 636 W HCPCS: Performed by: ANESTHESIOLOGY

## 2019-03-07 PROCEDURE — 71000044 HC DOSC ROUTINE RECOVERY FIRST HOUR: Performed by: UROLOGY

## 2019-03-07 PROCEDURE — 71000016 HC POSTOP RECOV ADDL HR: Performed by: UROLOGY

## 2019-03-07 PROCEDURE — 25000003 PHARM REV CODE 250: Performed by: STUDENT IN AN ORGANIZED HEALTH CARE EDUCATION/TRAINING PROGRAM

## 2019-03-07 PROCEDURE — 25000003 PHARM REV CODE 250: Performed by: NURSE ANESTHETIST, CERTIFIED REGISTERED

## 2019-03-07 PROCEDURE — 63600175 PHARM REV CODE 636 W HCPCS: Performed by: STUDENT IN AN ORGANIZED HEALTH CARE EDUCATION/TRAINING PROGRAM

## 2019-03-07 PROCEDURE — D9220A PRA ANESTHESIA: Mod: ANES,,, | Performed by: ANESTHESIOLOGY

## 2019-03-07 PROCEDURE — 63600175 PHARM REV CODE 636 W HCPCS: Performed by: NURSE ANESTHETIST, CERTIFIED REGISTERED

## 2019-03-07 PROCEDURE — D9220A PRA ANESTHESIA: ICD-10-PCS | Mod: CRNA,,, | Performed by: NURSE ANESTHETIST, CERTIFIED REGISTERED

## 2019-03-07 PROCEDURE — 36000706: Performed by: UROLOGY

## 2019-03-07 PROCEDURE — 52204 PR CYSTOURETHROSCOPY,BIOPSY: ICD-10-PCS | Mod: ,,, | Performed by: UROLOGY

## 2019-03-07 PROCEDURE — D9220A PRA ANESTHESIA: ICD-10-PCS | Mod: ANES,,, | Performed by: ANESTHESIOLOGY

## 2019-03-07 PROCEDURE — 36000707: Performed by: UROLOGY

## 2019-03-07 PROCEDURE — 52204 CYSTOSCOPY W/BIOPSY(S): CPT | Mod: ,,, | Performed by: UROLOGY

## 2019-03-07 PROCEDURE — D9220A PRA ANESTHESIA: Mod: CRNA,,, | Performed by: NURSE ANESTHETIST, CERTIFIED REGISTERED

## 2019-03-07 PROCEDURE — 88305 TISSUE EXAM BY PATHOLOGIST: CPT | Performed by: PATHOLOGY

## 2019-03-07 RX ORDER — TRAMADOL HYDROCHLORIDE 50 MG/1
50 TABLET ORAL ONCE
Status: DISCONTINUED | OUTPATIENT
Start: 2019-03-07 | End: 2019-03-07 | Stop reason: HOSPADM

## 2019-03-07 RX ORDER — TRAMADOL HYDROCHLORIDE 50 MG/1
50 TABLET ORAL EVERY 6 HOURS PRN
Qty: 5 TABLET | Refills: 0 | Status: SHIPPED | OUTPATIENT
Start: 2019-03-07 | End: 2019-03-17

## 2019-03-07 RX ORDER — SODIUM CHLORIDE 9 MG/ML
INJECTION, SOLUTION INTRAVENOUS CONTINUOUS
Status: DISCONTINUED | OUTPATIENT
Start: 2019-03-07 | End: 2019-03-07 | Stop reason: HOSPADM

## 2019-03-07 RX ORDER — HYDROCODONE BITARTRATE AND ACETAMINOPHEN 5; 325 MG/1; MG/1
1 TABLET ORAL EVERY 6 HOURS PRN
Qty: 7 TABLET | Refills: 0 | Status: CANCELLED | OUTPATIENT
Start: 2019-03-07

## 2019-03-07 RX ORDER — PROPOFOL 10 MG/ML
VIAL (ML) INTRAVENOUS CONTINUOUS PRN
Status: DISCONTINUED | OUTPATIENT
Start: 2019-03-07 | End: 2019-03-07

## 2019-03-07 RX ORDER — KETAMINE HCL IN 0.9 % NACL 50 MG/5 ML
SYRINGE (ML) INTRAVENOUS
Status: DISCONTINUED | OUTPATIENT
Start: 2019-03-07 | End: 2019-03-07

## 2019-03-07 RX ORDER — VANCOMYCIN HCL IN 5 % DEXTROSE 1G/250ML
1000 PLASTIC BAG, INJECTION (ML) INTRAVENOUS
Status: COMPLETED | OUTPATIENT
Start: 2019-03-07 | End: 2019-03-07

## 2019-03-07 RX ORDER — FENTANYL CITRATE 50 UG/ML
25 INJECTION, SOLUTION INTRAMUSCULAR; INTRAVENOUS EVERY 10 MIN PRN
Status: DISCONTINUED | OUTPATIENT
Start: 2019-03-07 | End: 2019-03-07 | Stop reason: HOSPADM

## 2019-03-07 RX ORDER — ONDANSETRON 8 MG/1
8 TABLET, ORALLY DISINTEGRATING ORAL EVERY 8 HOURS PRN
Status: DISCONTINUED | OUTPATIENT
Start: 2019-03-07 | End: 2019-03-07 | Stop reason: HOSPADM

## 2019-03-07 RX ORDER — PROPOFOL 10 MG/ML
VIAL (ML) INTRAVENOUS
Status: DISCONTINUED | OUTPATIENT
Start: 2019-03-07 | End: 2019-03-07

## 2019-03-07 RX ORDER — LIDOCAINE HCL/PF 100 MG/5ML
SYRINGE (ML) INTRAVENOUS
Status: DISCONTINUED | OUTPATIENT
Start: 2019-03-07 | End: 2019-03-07

## 2019-03-07 RX ORDER — LIDOCAINE HYDROCHLORIDE 10 MG/ML
INJECTION, SOLUTION EPIDURAL; INFILTRATION; INTRACAUDAL; PERINEURAL
Status: DISCONTINUED
Start: 2019-03-07 | End: 2019-03-07 | Stop reason: HOSPADM

## 2019-03-07 RX ORDER — FENTANYL CITRATE 50 UG/ML
INJECTION, SOLUTION INTRAMUSCULAR; INTRAVENOUS
Status: DISCONTINUED
Start: 2019-03-07 | End: 2019-03-07 | Stop reason: HOSPADM

## 2019-03-07 RX ORDER — CIPROFLOXACIN 2 MG/ML
400 INJECTION, SOLUTION INTRAVENOUS
Status: COMPLETED | OUTPATIENT
Start: 2019-03-07 | End: 2019-03-07

## 2019-03-07 RX ORDER — HYDROCODONE BITARTRATE AND ACETAMINOPHEN 5; 325 MG/1; MG/1
1 TABLET ORAL EVERY 4 HOURS PRN
Status: DISCONTINUED | OUTPATIENT
Start: 2019-03-07 | End: 2019-03-07 | Stop reason: HOSPADM

## 2019-03-07 RX ADMIN — PROPOFOL 100 MCG/KG/MIN: 10 INJECTION, EMULSION INTRAVENOUS at 07:03

## 2019-03-07 RX ADMIN — Medication 20 MG: at 07:03

## 2019-03-07 RX ADMIN — HYDROCODONE BITARTRATE AND ACETAMINOPHEN 1 TABLET: 5; 325 TABLET ORAL at 07:03

## 2019-03-07 RX ADMIN — PROPOFOL 30 MG: 10 INJECTION, EMULSION INTRAVENOUS at 07:03

## 2019-03-07 RX ADMIN — LIDOCAINE HYDROCHLORIDE 100 MG: 20 INJECTION, SOLUTION INTRAVENOUS at 07:03

## 2019-03-07 RX ADMIN — FENTANYL CITRATE 25 MCG: 50 INJECTION INTRAMUSCULAR; INTRAVENOUS at 08:03

## 2019-03-07 RX ADMIN — CIPROFLOXACIN 400 MG: 2 INJECTION, SOLUTION INTRAVENOUS at 06:03

## 2019-03-07 RX ADMIN — FENTANYL CITRATE 25 MCG: 50 INJECTION INTRAMUSCULAR; INTRAVENOUS at 12:03

## 2019-03-07 RX ADMIN — SODIUM CHLORIDE: 0.9 INJECTION, SOLUTION INTRAVENOUS at 05:03

## 2019-03-07 RX ADMIN — VANCOMYCIN HYDROCHLORIDE 1000 MG: 1 INJECTION, POWDER, LYOPHILIZED, FOR SOLUTION INTRAVENOUS at 06:03

## 2019-03-07 RX ADMIN — Medication 10 MG: at 07:03

## 2019-03-07 NOTE — OP NOTE
[facility]  Operative Note    Date: 03/07/2019    Pre-Op Diagnosis: Bladder tumor    Patient Active Problem List    Diagnosis Date Noted    Bladder tumor 03/07/2019    Late onset Alzheimer's disease without behavioral disturbance 07/20/2018    CVD (cardiovascular disease) 07/20/2018    Major neurocognitive disorder due to Alzheimer's disease, probable, without behavioral disturbance 07/19/2018    Cerebrovascular disease 12/27/2017    Cerebrovascular accident (CVA) due to embolism of left middle cerebral artery 03/06/2017    Right hemiparesis 02/21/2017    Homonymous bilateral field defects of right side 02/21/2017    Gait disturbance 02/20/2017    DON (obstructive sleep apnea) 09/20/2016    Sleep disturbances 09/20/2016    Bradycardia 10/11/2013    Hypothyroidism 09/13/2012    BPH (benign prostatic hypertrophy) 09/13/2012    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  Cystoscopy   2.  Bladder biopsy and fulguration    Specimen(s): Bladder tumor    Staff Surgeon: Otf Wadsworth MD    Assistant Surgeon: Kailash Hickman MD, Gabriela Grande MD    Anesthesia: General mask inhalational anesthesia    Indications: Chivo Hawkins is a 89 y.o. male with history of gross hematuria and small bladder tumor    Findings:   Small bladder tumor <2cm at left bladder dome, biopsied and area fulgurated  No other lesions detected  Multiple cellules and trabeculations  Wide caliber, short segment bulbar urethral structure easily passed through     Estimated Blood Loss: min    Drains: none     Procedure in Detail:  After risks, benefits and possible complications of cystoscopy were explained, the patient elected to undergo the procedure and informed consent was obtained. All questions were answered in the matt-operative area. The patient was transferred to the  cystoscopy suite and placed in the supine position.  SCDs were applied and working.  Anesthesia was administered.  Once the patient was adequately sedated, he was placed in the dorsal lithotomy position and prepped and draped in the usual sterile fashion.  Time out was performed, matt-procedural antibiotics were confirmed.     A rigid cystoscope in a 22 Fr sheath was introduced into the bladder per urethra. This passed easily.  The entire urethra was visualized which showed a wide caliber stricture in the bulbar urethra, the scope was easily passed through this.  The right and left ureteral orifices were identified in the normal anatomic position and were seen effluxing clear urine.  Formal cystoscopy was performed which revealed a small papillary left bladder dome tumor, no other masses or lesions suspicious for malignancy, some mild trabeculations, no bladder stones and several small bladder cellules.     The identified left dome lesion was biopsied several times with cold cup biopsy forceps. The area was then fulgurated using bugbee electrocautery and hemostasis was achieved     The bladder was drained, and the patient was removed from lithotomy.     The patient tolerated the procedure well and was transferred to recovery in stable condition.    Disposition:  The patient will follow up with Dr. Wadsworth in 2 weeks.  The patient was given prescriptions for tramadol.      Kailash Hickman MD

## 2019-03-07 NOTE — DISCHARGE SUMMARY
[facility]  Discharge Note  Short Stay    Admit Date: 3/7/2019    Discharge Date and Time: 03/07/2019 7:20 AM      Attending Physician: Otf Wadsworth MD     Discharge Provider: Kailash Hickamn    Diagnoses:  Active Hospital Problems    Diagnosis  POA    Bladder tumor [D49.4]  Yes      Resolved Hospital Problems   No resolved problems to display.       Discharged Condition: good    Hospital Course: Patient was admitted for bladder biopsy and fulguration and tolerated the procedure well with no complications. The patient was discharged home in good condition on the same day.       Final Diagnoses: Same as principal problem.    Disposition: Home or Self Care    Follow up/Patient Instructions:    Medications:  Reconciled Home Medications:   Current Discharge Medication List      START taking these medications    Details   traMADol (ULTRAM) 50 mg tablet Take 1 tablet (50 mg total) by mouth every 6 (six) hours as needed for Pain.  Qty: 5 tablet, Refills: 0         CONTINUE these medications which have NOT CHANGED    Details   cyanocobalamin 500 MCG tablet Take 500 mcg by mouth once daily.      !! levothyroxine (SYNTHROID) 100 MCG tablet TAKE 1 TABLET(100 MCG) BY MOUTH EVERY MORNING  Qty: 90 tablet, Refills: 3    Associated Diagnoses: Bradycardia      !! levothyroxine (SYNTHROID) 100 MCG tablet TAKE 1 TABLET(100 MCG) BY MOUTH EVERY MORNING  Qty: 90 tablet, Refills: 0    Associated Diagnoses: Bradycardia      MULTI-VITAMIN HI-PO ORAL Take by mouth.        nitrofurantoin (MACRODANTIN) 100 MG capsule Take 1 capsule (100 mg total) by mouth 2 (two) times daily. for 7 days  Qty: 14 capsule, Refills: 0      rivastigmine tartrate (EXELON) 3 MG capsule TK ONE C PO  BID  Refills: 2      tamsulosin (FLOMAX) 0.4 mg Cap TAKE 1 CAPSULE BY MOUTH EVERY DAY  Qty: 90 capsule, Refills: 0      traZODone (DESYREL) 50 MG tablet One tablet at bedtime as needed for sleep  Qty: 30 tablet, Refills: 1      aspirin 81 MG Chew Take 81 mg  by mouth once daily.      salicylic acid 6 % Sham Apply 1 application topically once daily.        !! - Potential duplicate medications found. Please discuss with provider.        Discharge Procedure Orders   Diet general     Call MD for:  temperature >100.4     Call MD for:  persistent nausea and vomiting     Call MD for:  severe uncontrolled pain     Call MD for:  difficulty breathing, headache or visual disturbances     Call MD for:  redness, tenderness, or signs of infection (pain, swelling, redness, odor or green/yellow discharge around incision site)     Call MD for:  hives     Call MD for:  persistent dizziness or light-headedness     Call MD for:  extreme fatigue     Follow-up Information     Otf Wadsworth MD In 2 weeks.    Specialty:  Urology  Contact information:  [facility] [address] [phone]

## 2019-03-07 NOTE — ANESTHESIA PREPROCEDURE EVALUATION
03/07/2019  Chivo Hawkins is a 89 y.o., male.    Anesthesia Evaluation         Review of Systems  Social:  Alcohol Use, Former Smoker   Hematology/Oncology:        Oncology Comments: Bladder tumor,  Prostate cancer   Pulmonary:   Sleep Apnea    Renal/:   BPH Bladder tumor   Musculoskeletal:   Arthritis   Spine Disorders: lumbar Degenerative disease    Neurological:   CVA Right hemiparesis   Endocrine:   Hypothyroidism        Physical Exam  General:  Well nourished    Airway/Jaw/Neck:  Airway Findings: Mouth Opening: Normal Tongue: Normal  General Airway Assessment: Adult            Mental Status:  Mental Status Findings:  Alert and Oriented, Cooperative         Anesthesia Plan  Type of Anesthesia, risks & benefits discussed:  Anesthesia Type:  general  Patient's Preference:   Intra-op Monitoring Plan: standard ASA monitors  Intra-op Monitoring Plan Comments:   Post Op Pain Control Plan:   Post Op Pain Control Plan Comments:   Induction:   IV  Beta Blocker:  Patient is not currently on a Beta-Blocker (No further documentation required).       Informed Consent: Patient understands risks and agrees with Anesthesia plan.  Questions answered. Anesthesia consent signed with patient.  ASA Score: 3     Day of Surgery Review of History & Physical:    H&P update referred to the surgeon.         Ready For Surgery From Anesthesia Perspective.

## 2019-03-07 NOTE — DISCHARGE INSTRUCTIONS
Follow up with Dr. Wadsworth Monday 3/11/19.        Cystoscopy    Cystoscopy is a procedure that lets your doctor look directly inside your urethra and bladder. It can be used to:  · Help diagnose a problem with your urethra, bladder, or kidneys.  · Take a sample (biopsy) of bladder or urethral tissue.  · Treat certain problems (such as removing kidney stones).  · Place a stent to bypass an obstruction.  · Take special X-rays of the kidneys.  Based on the findings, your doctor may recommend other tests or treatments.  What is a cystoscope?  A cystoscope is a telescope-like instrument that contains lenses and fiberoptics (small glass wires that make bright light). The cystoscope may be straight and rigid, or flexible to bend around curves in the urethra. The doctor may look directly into the cystoscope, or project the image onto a monitor.  Getting ready  · Ask your doctor if you should stop taking any medicines before the procedure.  · Ask whether you should avoid eating or drinking anything after midnight before the procedure.  · Follow any other instructions your doctor gives you.  Tell your doctor before the exam if you:  · Take any medicines, such as aspirin or blood thinners  · Have allergies to any medicines  · Are pregnant   The procedure  Cystoscopy is done in the doctors office, surgery center, or hospital. The doctor and a nurse are present during the procedure. It takes only a few minutes, longer if a biopsy, X-ray, or treatment needs to be done.  During the procedure:  · You lie on an exam table on your back, knees bent and legs apart. You are covered with a drape.  · Your urethra and the area around it are washed. Anesthetic jelly may be applied to numb the urethra. Other pain medicine is usually not needed. In some cases, you may be offered a mild sedative to help you relax. If a more extensive procedure is to be done, such as a biopsy or kidney stone removal, general anesthesia may be needed.  · The  cystoscope is inserted. A sterile fluid is put into the bladder to expand it. You may feel pressure from this fluid.  · When the procedure is done, the cystoscope is removed.  After the procedure  If you had a sedative, general anesthesia, or spinal anesthesia, you must have someone drive you home. Once youre home:  · Drink plenty of fluids.  · You may have burning or light bleeding when you urinate--this is normal.  · Medicines may be prescribed to ease any discomfort or prevent infection. Take these as directed.  · Call your doctor if you have heavy bleeding or blood clots, burning that lasts more than a day, a fever over 100°F  (38° C), or trouble urinating.  Date Last Reviewed: 1/1/2017  © 9518-7845 [employer]. [address] [place] [address], [place] [address]. All rights reserved. This information is not intended as a substitute for professional medical care. Always follow your healthcare professional's instructions.

## 2019-03-07 NOTE — PLAN OF CARE
Discharge instructions given to patient and daughter. Educated patient/daughter on procedure and post op instructions, medications and when to follow up within designated time frame. Patient/Daughter verbalized understanding. Patient denies nausea at this time. PO fluids tolerated.

## 2019-03-07 NOTE — PROGRESS NOTES
Dr. Hickman at bedside. Minimal bleeding noted at this time. No new orders given. Awaitng pt to urinate.

## 2019-03-07 NOTE — INTERVAL H&P NOTE
The patient has been examined and the H&P has been reviewed:    I concur with the findings and no changes have occurred since H&P was written.     Urine dipstick today negative for all components    Anesthesia/Surgery risks, benefits and alternative options discussed and understood by patient/family.          Active Hospital Problems    Diagnosis  POA    Bladder tumor [D49.4]  Yes      Resolved Hospital Problems   No resolved problems to display.

## 2019-03-08 NOTE — ANESTHESIA POSTPROCEDURE EVALUATION
"Anesthesia Post Evaluation    Patient: Chivo Hawkins    Procedure(s) Performed: Procedure(s) (LRB):  CYSTOSCOPY, WITH BLADDER BIOPSY, WITH FULGURATION IF INDICATED (N/A)    Final Anesthesia Type: general  Patient location during evaluation: PACU  Patient participation: Yes- Able to Participate  Level of consciousness: awake and alert  Post-procedure vital signs: reviewed and stable  Pain management: adequate  Airway patency: patent  PONV status at discharge: No PONV  Anesthetic complications: no      Cardiovascular status: blood pressure returned to baseline  Respiratory status: unassisted  Hydration status: euvolemic  Follow-up not needed.        Visit Vitals  BP (!) 165/71   Pulse 67   Temp 36.7 °C (98.1 °F) (Temporal)   Resp 16   Ht 5' 5" (1.651 m)   Wt 70.3 kg (155 lb)   SpO2 98%   BMI 25.79 kg/m²       Pain/Susan Score: Pain Rating Prior to Med Admin: 9 (3/7/2019 12:10 PM)  Susan Score: 10 (3/7/2019 12:50 PM)        "

## 2019-03-11 ENCOUNTER — OFFICE VISIT (OUTPATIENT)
Dept: UROLOGY | Facility: CLINIC | Age: 84
End: 2019-03-11
Payer: MEDICARE

## 2019-03-11 VITALS
HEART RATE: 73 BPM | DIASTOLIC BLOOD PRESSURE: 65 MMHG | BODY MASS INDEX: 27 KG/M2 | SYSTOLIC BLOOD PRESSURE: 155 MMHG | WEIGHT: 162.06 LBS | HEIGHT: 65 IN

## 2019-03-11 DIAGNOSIS — R33.9 URINARY RETENTION: ICD-10-CM

## 2019-03-11 DIAGNOSIS — D49.4 BLADDER TUMOR: Primary | ICD-10-CM

## 2019-03-11 PROCEDURE — 99999 PR PBB SHADOW E&M-EST. PATIENT-LVL III: ICD-10-PCS | Mod: PBBFAC,,, | Performed by: UROLOGY

## 2019-03-11 PROCEDURE — 99213 OFFICE O/P EST LOW 20 MIN: CPT | Mod: S$GLB,,, | Performed by: UROLOGY

## 2019-03-11 PROCEDURE — 99999 PR PBB SHADOW E&M-EST. PATIENT-LVL III: CPT | Mod: PBBFAC,,, | Performed by: UROLOGY

## 2019-03-11 PROCEDURE — 99213 PR OFFICE/OUTPT VISIT, EST, LEVL III, 20-29 MIN: ICD-10-PCS | Mod: S$GLB,,, | Performed by: UROLOGY

## 2019-03-11 PROCEDURE — 1101F PT FALLS ASSESS-DOCD LE1/YR: CPT | Mod: CPTII,S$GLB,, | Performed by: UROLOGY

## 2019-03-11 PROCEDURE — 1101F PR PT FALLS ASSESS DOC 0-1 FALLS W/OUT INJ PAST YR: ICD-10-PCS | Mod: CPTII,S$GLB,, | Performed by: UROLOGY

## 2019-03-11 NOTE — PROGRESS NOTES
Subjective:       Patient ID: Chivo Hawkins is a 89 y.o. male.    Chief Complaint:  Voiding Trail      History of Present Illness  HPI  Patient is a 89 y.o. male who is established to our clinic and was initially referred by their PCP, Dr. Bo for evaluation of gross hematuria.  Found to have a small bladder tumor on cystoscopy.  Underwent a cysto/bladder biopsy/fulguration last week.. Pathology pending.  Was unable to urinate in the PACU and had a bravo placed.  Here for void trial.       Review of Systems  Review of Systems  All other systems reviewed and negative except pertinent positives noted in HPI.       Objective:     Physical Exam   Constitutional: He is oriented to person, place, and time. He appears well-developed and well-nourished. No distress.   HENT:   Head: Normocephalic and atraumatic.   Eyes: No scleral icterus.   Neck: No tracheal deviation present.   Pulmonary/Chest: Effort normal. No respiratory distress.   Neurological: He is alert and oriented to person, place, and time.   Psychiatric: He has a normal mood and affect. His behavior is normal. Judgment and thought content normal.       Lab Review  Lab Results   Component Value Date    COLORU Yellow 06/04/2018    SPECGRAV 1.015 06/04/2018    PHUR 7.0 06/04/2018    NITRITE Negative 06/04/2018    KETONESU 1+ (A) 06/04/2018    UROBILINOGEN Negative 06/04/2018         Assessment:        1. Bladder tumor    2. Urinary retention            Plan:     Bladder tumor    Urinary retention    -void trial successful today  -pathology pending.  -f/u next Wednesday for pathology review.  I spent 15 minutes with the patient of which more than half was spent in direct consultation with the patient in regards to our treatment and plan.

## 2019-03-13 ENCOUNTER — OFFICE VISIT (OUTPATIENT)
Dept: UROLOGY | Facility: CLINIC | Age: 84
End: 2019-03-13
Payer: MEDICARE

## 2019-03-13 VITALS
WEIGHT: 160 LBS | DIASTOLIC BLOOD PRESSURE: 79 MMHG | HEART RATE: 62 BPM | SYSTOLIC BLOOD PRESSURE: 183 MMHG | HEIGHT: 66 IN | BODY MASS INDEX: 25.71 KG/M2

## 2019-03-13 DIAGNOSIS — D49.4 BLADDER TUMOR: Primary | ICD-10-CM

## 2019-03-13 PROCEDURE — 99999 PR PBB SHADOW E&M-EST. PATIENT-LVL III: CPT | Mod: PBBFAC,,, | Performed by: UROLOGY

## 2019-03-13 PROCEDURE — 1101F PT FALLS ASSESS-DOCD LE1/YR: CPT | Mod: CPTII,S$GLB,, | Performed by: UROLOGY

## 2019-03-13 PROCEDURE — 1101F PR PT FALLS ASSESS DOC 0-1 FALLS W/OUT INJ PAST YR: ICD-10-PCS | Mod: CPTII,S$GLB,, | Performed by: UROLOGY

## 2019-03-13 PROCEDURE — 99999 PR PBB SHADOW E&M-EST. PATIENT-LVL III: ICD-10-PCS | Mod: PBBFAC,,, | Performed by: UROLOGY

## 2019-03-13 PROCEDURE — 99213 PR OFFICE/OUTPT VISIT, EST, LEVL III, 20-29 MIN: ICD-10-PCS | Mod: S$GLB,,, | Performed by: UROLOGY

## 2019-03-13 PROCEDURE — 99213 OFFICE O/P EST LOW 20 MIN: CPT | Mod: S$GLB,,, | Performed by: UROLOGY

## 2019-03-13 NOTE — PROGRESS NOTES
Subjective:       Patient ID: Chivo Hawkins is a 89 y.o. male.    Chief Complaint:  Urinary Retention      History of Present Illness  HPI  Patient is a 89 y.o. male who is established to our clinic and was initially referred by their PCP, Dr. Bo for evaluation of gross hematuria.  Found to have a small bladder tumor on cystoscopy.  Underwent a cysto/bladder biopsy/fulguration last week.. Pathology pending.  Was unable to urinate this morning after successful void trial 2 days ago.  He is not uncomfortable.    A post void residual bladder scan was performed immediately after voiding. The patient's PVR was noted to be 233 cc.  Patient voided shortly after bladder scan        Review of Systems  Review of Systems  All other systems reviewed and negative except pertinent positives noted in HPI.       Objective:     Physical Exam   Constitutional: He is oriented to person, place, and time. He appears well-developed and well-nourished. No distress.   HENT:   Head: Normocephalic and atraumatic.   Eyes: No scleral icterus.   Neck: No tracheal deviation present.   Pulmonary/Chest: Effort normal. No respiratory distress.   Neurological: He is alert and oriented to person, place, and time.   Psychiatric: He has a normal mood and affect. His behavior is normal. Judgment and thought content normal.       Lab Review  Lab Results   Component Value Date    COLORU Yellow 06/04/2018    SPECGRAV 1.015 06/04/2018    PHUR 7.0 06/04/2018    NITRITE Negative 06/04/2018    KETONESU 1+ (A) 06/04/2018    UROBILINOGEN Negative 06/04/2018         Assessment:        1. Bladder tumor            Plan:     Bladder tumor       -patient was able to pee today  -pathology still pending  -follow-up for path review near future  -I spent 15 minutes with the patient of which more than half was spent in direct consultation with the patient in regards to our treatment and plan.

## 2019-03-20 ENCOUNTER — OFFICE VISIT (OUTPATIENT)
Dept: UROLOGY | Facility: CLINIC | Age: 84
End: 2019-03-20
Payer: MEDICARE

## 2019-03-20 VITALS
HEIGHT: 65 IN | BODY MASS INDEX: 26.41 KG/M2 | WEIGHT: 158.5 LBS | SYSTOLIC BLOOD PRESSURE: 162 MMHG | HEART RATE: 57 BPM | DIASTOLIC BLOOD PRESSURE: 67 MMHG

## 2019-03-20 DIAGNOSIS — D49.4 BLADDER TUMOR: Primary | ICD-10-CM

## 2019-03-20 PROCEDURE — 99499 UNLISTED E&M SERVICE: CPT | Mod: S$GLB,,, | Performed by: UROLOGY

## 2019-03-20 PROCEDURE — 99999 PR PBB SHADOW E&M-EST. PATIENT-LVL III: CPT | Mod: PBBFAC,,, | Performed by: UROLOGY

## 2019-03-20 PROCEDURE — 99499 NO LOS: ICD-10-PCS | Mod: S$GLB,,, | Performed by: UROLOGY

## 2019-03-20 PROCEDURE — 99999 PR PBB SHADOW E&M-EST. PATIENT-LVL III: ICD-10-PCS | Mod: PBBFAC,,, | Performed by: UROLOGY

## 2019-03-27 ENCOUNTER — OFFICE VISIT (OUTPATIENT)
Dept: UROLOGY | Facility: CLINIC | Age: 84
End: 2019-03-27
Payer: MEDICARE

## 2019-03-27 VITALS
DIASTOLIC BLOOD PRESSURE: 67 MMHG | HEART RATE: 56 BPM | WEIGHT: 158 LBS | SYSTOLIC BLOOD PRESSURE: 146 MMHG | HEIGHT: 65 IN | BODY MASS INDEX: 26.33 KG/M2

## 2019-03-27 DIAGNOSIS — C67.1 MALIGNANT NEOPLASM OF DOME OF URINARY BLADDER: Primary | ICD-10-CM

## 2019-03-27 PROCEDURE — 99999 PR PBB SHADOW E&M-EST. PATIENT-LVL III: CPT | Mod: PBBFAC,,, | Performed by: UROLOGY

## 2019-03-27 PROCEDURE — 99999 PR PBB SHADOW E&M-EST. PATIENT-LVL III: ICD-10-PCS | Mod: PBBFAC,,, | Performed by: UROLOGY

## 2019-03-27 PROCEDURE — 1101F PT FALLS ASSESS-DOCD LE1/YR: CPT | Mod: CPTII,S$GLB,, | Performed by: UROLOGY

## 2019-03-27 PROCEDURE — 1101F PR PT FALLS ASSESS DOC 0-1 FALLS W/OUT INJ PAST YR: ICD-10-PCS | Mod: CPTII,S$GLB,, | Performed by: UROLOGY

## 2019-03-27 PROCEDURE — 99213 PR OFFICE/OUTPT VISIT, EST, LEVL III, 20-29 MIN: ICD-10-PCS | Mod: S$GLB,,, | Performed by: UROLOGY

## 2019-03-27 PROCEDURE — 99213 OFFICE O/P EST LOW 20 MIN: CPT | Mod: S$GLB,,, | Performed by: UROLOGY

## 2019-03-27 NOTE — PROGRESS NOTES
Subjective:       Patient ID: Chivo Hawkins is a 89 y.o. male.    Chief Complaint:  Follow-up (biopsy results)      History of Present Illness  HPI  Patient is a 89 y.o. male who is established to our clinic and was initially referred by their PCP, Dr. Bo for evaluation of gross hematuria.  Found to have a small bladder tumor on cystoscopy.  Underwent a cysto/bladder biopsy/fulguration last week.. Pathology showed low grade non-invasive bladder cancer.             Review of Systems  Review of Systems  All other systems reviewed and negative except pertinent positives noted in HPI.       Objective:     Physical Exam   Constitutional: He is oriented to person, place, and time. He appears well-developed and well-nourished. No distress.   HENT:   Head: Normocephalic and atraumatic.   Eyes: No scleral icterus.   Neck: No tracheal deviation present.   Pulmonary/Chest: Effort normal. No respiratory distress.   Neurological: He is alert and oriented to person, place, and time.   Psychiatric: He has a normal mood and affect. His behavior is normal. Judgment and thought content normal.       Lab Review  Lab Results   Component Value Date    COLORU Yellow 06/04/2018    SPECGRAV 1.015 06/04/2018    PHUR 7.0 06/04/2018    NITRITE Negative 06/04/2018    KETONESU 1+ (A) 06/04/2018    UROBILINOGEN Negative 06/04/2018         Assessment:        1. Malignant neoplasm of dome of urinary bladder            Plan:     Malignant neoplasm of dome of urinary bladder       -pathology reviewed---explained that based on pathology, he is low risk stratification.  -Cystoscopy in 3 months per guidelines.  -I spent 15 minutes with the patient of which more than half was spent in direct consultation with the patient in regards to our treatment and plan.

## 2019-04-15 ENCOUNTER — TELEPHONE (OUTPATIENT)
Dept: INFECTIOUS DISEASES | Facility: CLINIC | Age: 84
End: 2019-04-15

## 2019-04-15 NOTE — TELEPHONE ENCOUNTER
----- Message from Terri De Luna MA sent at 4/12/2019  1:49 PM CDT -----  Contact: [facility]  [phone]  Ms Gamino / [facility]  [phone] stated they are trying to get the driving evaluation through [facility], please put order in Epic.       ----- Message -----  From: Maria Del Carmen Fernandes  Sent: 4/12/2019  11:30 AM  To: Betzy HINOJOSA Staff    Type: Need Order for Patient driving evaluationl    Who Called: Ms Gamino    Would the patient rather a call back or a response via [facility]? call  Best Call Back Number [phone]    Additional Information: Ms Gamino states need to speak with nurse  Need Driving evaluation order for patient

## 2019-04-22 ENCOUNTER — TELEPHONE (OUTPATIENT)
Dept: INFECTIOUS DISEASES | Facility: CLINIC | Age: 84
End: 2019-04-22

## 2019-04-22 DIAGNOSIS — Z91.89 DRIVING SAFETY ISSUE: Primary | ICD-10-CM

## 2019-04-22 NOTE — TELEPHONE ENCOUNTER
"----- Message from Terri De Luna MA sent at 4/22/2019 10:52 AM CDT -----  Daughter: Kirstie Vasquez [phone]    - She will bring him to his appointment    Ms Gamino / [facility]  [phone]    - She called to let us know this needs to be done    Ext: Out Patient therapy services ext: [phone]   - Need to put in order for Occupational therapy, in the notes " Driving Evaluation"      Patient was scheduled for one last year but no showed.       "

## 2019-04-23 ENCOUNTER — TELEPHONE (OUTPATIENT)
Dept: INFECTIOUS DISEASES | Facility: CLINIC | Age: 84
End: 2019-04-23

## 2019-04-23 DIAGNOSIS — Z91.89 DRIVING SAFETY ISSUE: Primary | ICD-10-CM

## 2019-06-05 ENCOUNTER — HOSPITAL ENCOUNTER (OUTPATIENT)
Dept: UROLOGY | Facility: HOSPITAL | Age: 84
Discharge: HOME | End: 2019-06-05
Attending: UROLOGY
Payer: MEDICARE

## 2019-06-05 VITALS
HEIGHT: 65 IN | BODY MASS INDEX: 26.33 KG/M2 | DIASTOLIC BLOOD PRESSURE: 85 MMHG | HEART RATE: 62 BPM | WEIGHT: 158.06 LBS | RESPIRATION RATE: 17 BRPM | SYSTOLIC BLOOD PRESSURE: 165 MMHG | TEMPERATURE: 98 F

## 2019-06-05 DIAGNOSIS — C67.1 MALIGNANT NEOPLASM OF DOME OF URINARY BLADDER: ICD-10-CM

## 2019-06-05 PROCEDURE — 52000 CYSTOURETHROSCOPY: CPT

## 2019-06-05 PROCEDURE — 52000 PR CYSTOURETHROSCOPY: ICD-10-PCS | Mod: ,,, | Performed by: UROLOGY

## 2019-06-05 PROCEDURE — 52000 CYSTOURETHROSCOPY: CPT | Mod: ,,, | Performed by: UROLOGY

## 2019-06-05 RX ORDER — LIDOCAINE HYDROCHLORIDE 20 MG/ML
JELLY TOPICAL
Status: COMPLETED | OUTPATIENT
Start: 2019-06-05 | End: 2019-06-05

## 2019-06-05 RX ADMIN — LIDOCAINE HYDROCHLORIDE: 20 JELLY TOPICAL at 12:06

## 2019-06-05 NOTE — H&P
Subjective:       Patient ID: Chivo Hawkins is a 89 y.o. male.    Chief Complaint:  Other (Malignant neoplasm of do*)      History of Present Illness  HPI  Patient is a 89 y.o. male who is established to our clinic and was initially referred by their PCP, Dr. Bo for evaluation of gross hematuria.  Found to have a small bladder tumor on cystoscopy.  Underwent a cysto/bladder biopsy/fulguration on 3/7/19.  Pathology showed low grade non-invasive bladder cancer.             Review of Systems  Review of Systems  All other systems reviewed and negative except pertinent positives noted in HPI.       Objective:     Physical Exam   Constitutional: He is oriented to person, place, and time. He appears well-developed and well-nourished. No distress.   HENT:   Head: Normocephalic and atraumatic.   Eyes: No scleral icterus.   Neck: No tracheal deviation present.   Pulmonary/Chest: Effort normal. No respiratory distress.   Neurological: He is alert and oriented to person, place, and time.   Psychiatric: He has a normal mood and affect. His behavior is normal. Judgment and thought content normal.       Lab Review  Lab Results   Component Value Date    COLORU Yellow 06/04/2018    SPECGRAV 1.015 06/04/2018    PHUR 7.0 06/04/2018    NITRITE Negative 06/04/2018    KETONESU 1+ (A) 06/04/2018    UROBILINOGEN Negative 06/04/2018         Assessment:        1. Malignant neoplasm of dome of urinary bladder            Plan:     Malignant neoplasm of dome of urinary bladder  -     Cystoscopy; Standing  -     Cystoscopy  -     lidocaine HCl 2% urojet       -I have explained the indication, risks, benefits, and alternatives of the procedure in detail.  The patient voices understanding and all questions have been answered.  The patient agrees to proceed as planned with surveillance cystoscopy.

## 2019-06-05 NOTE — PATIENT INSTRUCTIONS
What to Expect After a Cystoscopy  For the next 24-48 hours, you may feel a mild burning when you urinate. This burning is normal and expected. Drink plenty of water to dilute the urine to help relieve the burning sensation. You may also see a small amount of blood in your urine after the procedure.    Unless you are already taking antibiotics, you may be given an antibiotic after the test to prevent infection.    Signs and Symptoms to Report  Call the [facility] at [phone] if you develop any of the following:  · Fever of 101 degrees or higher  · Chills or persistent bleeding  · Inability to urinate

## 2019-06-05 NOTE — PROCEDURES
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  The risks and benefits were explained.    2% lidocaine urojet was used for local analgesia.  The genitalia was prepped and draped in the sterile fashion with betadine.    The flexible scope was advanced into the urethra.  No urethral strictures were noted.  The prostate showed Mild hypertrophy.  There was No median lobe present.  Bilateral ureteral orifices were evaluated and noted to be normal with clear efflux.  The bladder was completely surveyed in a systematic fashion.   No bladder tumors or lesions were seen.      The patient tolerated the procedure well without complication.    They will follow up in 9 month(s) for repeat cystoscopy.

## 2019-06-25 DIAGNOSIS — Z91.89 DRIVING SAFETY ISSUE: Primary | ICD-10-CM

## 2019-07-09 ENCOUNTER — CLINICAL SUPPORT (OUTPATIENT)
Dept: REHABILITATION | Facility: HOSPITAL | Age: 84
End: 2019-07-09
Attending: INTERNAL MEDICINE
Payer: MEDICARE

## 2019-07-09 DIAGNOSIS — Z91.89 DRIVING SAFETY ISSUE: ICD-10-CM

## 2019-07-09 NOTE — PROGRESS NOTES
"Occupational Therapy   Driving Evaluation Clinic Only     Chivo Hawkins   MRN: [id number]     Referring Physician: Mandeep Bo MD  Diagnosis: Driving safety issue   History: Pt is currently a licensed  but has been referred to Occupational Therapy by his MD for clinical and on-road testing to be used for driving evaluation.     LADL [id number]   Exp 7/10/2012   Restrictions Corrective lens    Date last drove: Pt reports that he drove last week    SUBJECTIVE:    " I guess I didn't realize that my vision was that bad."     OBJECTIVE:   Physical Function Testing:    ROM:  WFLs B UEs    Head / Neck ROM: WFL's   Pt is right hand dominant   Strength: WFL B UEs  Balance:    Static and Dynamic sitting- Normal   Static Standing - Normal     Dynamic standing - Normal   Transfers and Mobility: Modified Independent for slightly increased time but he does not use a device   Rapid Pace Walk: 7.7 seconds which is faster and better than the average standard of 8 seconds    Perceptual testing:   Letter cancellation:  33/34 passing score where testing was completed in 1 minute 31 seconds, average time for completion is 1 minute 15 seconds   Line bisection:  WNLs  Maze Test - 67  seconds, No error(s)  (Cut point score is 60 seconds or less with 1 error or less)   Trail Making Test A - 70  seconds   (Average 29 seconds, Deficient > 78 seconds)    Trail Making Test B -  131  Seconds with 2 errors  (Average 75 seconds, Deficient > 273 seconds)   Motor Free Visual Perception Test (MVPT),  which assesses figure ground, visual closure and visual memory, 34/36, a passing score, where testing was competed in 8 minutes 12 seconds, average time for completion is 7 to 8 minutes   Right/Left discrimination: 4/4   Auditory discrimination: [facility]     Vision testing:     Glasses were worn during testing  Quick acuity and night vision: deficient  Color vision: 4/4,   Pt correctly identified only 3/12 road signs and was only 1/4 for depth " perception  Both eye acuity: 20/100  Right eye acuity: 20/100  Left eye acuity: 20/100  Phoria which assesses binocular vision showed that there is a problem with visual alignment  Horizontal field test and nasal vision: Normal     Visual acuity minimum standards for the [facility] is 20/40 in one eye.  Cognitive testing:   Short term memory:  ,    Immediate # recall (Digit span) : 8, a high passing score    Cognitive sequencing of months of year in reverse:  No error, no delay   Long term memory:  WNL     [facility] Mental Status Exam (UMS) which is used to  detect mild neurocognitive disorder and dementia: Pt scored 23/30 which  scores in the range indicating mild neurocognitive disorder.    Judgment questions regarding rules of the road:     Insight:  Good as Pt stated that he had already cut back on his driving and at the post-testing discussion, he was understanding of the recommendations.     Communication:   Expressive aphasia:  Not found  Receptive aphasia:  Not found    Reaction time testin/100s or a second (avg of 3 trials) which is slightly slower  than the average standard of 50/100s of a second. 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 Even after instruction, he needed additional cues to use the correct foot for braking.     In-car / on-road assessment:  On road testing not performed as Pt's has too many deficits in clinical testing to be able safely operate an automobile.     ASSESSMENT:     Dr Hawkins had poor scores in multiple areas of testing in the clinic. Memory deficits were noted throughout testing as well as lower scores in road sign recognition, judgement for rules of the road and an overall slower response time for decision making that was noted throughout this clinical session. His distance vision is very poor even with his glasses on where his visual acuity  was not near State minimum standards as well as poor depth perception. 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 His daughter, [place] was present for testing and after results and recommendations were discussed both she and her father were understanding of the recommendation. Dr Hawkins other daughter, Maria Del Carmen Vasquez was also contacted by phone with the recommendations. Both reported that the family and the facility where he resides can provide him the transportation that he will need.  Findings were notified to the office of Mandeep Bo MD.     PLAN:   Discharge patient from outpatient Occupational Therapy services as Pt and MD needs being met with completion and reporting of this evaluation.      WILMER Aquino, S  Occupational Therapist, Certified  Rehabilitation Specialist  7/9/2019

## 2019-11-16 ENCOUNTER — HOSPITAL ENCOUNTER (OUTPATIENT)
Facility: HOSPITAL | Age: 84
Discharge: HOME | End: 2019-11-16
Attending: EMERGENCY MEDICINE | Admitting: INTERNAL MEDICINE
Payer: MEDICARE

## 2019-11-16 VITALS
TEMPERATURE: 98 F | HEIGHT: 65 IN | DIASTOLIC BLOOD PRESSURE: 66 MMHG | RESPIRATION RATE: 18 BRPM | HEART RATE: 92 BPM | BODY MASS INDEX: 22.49 KG/M2 | SYSTOLIC BLOOD PRESSURE: 147 MMHG | OXYGEN SATURATION: 95 % | WEIGHT: 135 LBS

## 2019-11-16 DIAGNOSIS — N20.0 NEPHROLITHIASIS: ICD-10-CM

## 2019-11-16 DIAGNOSIS — N30.01 ACUTE CYSTITIS WITH HEMATURIA: ICD-10-CM

## 2019-11-16 DIAGNOSIS — C61 PROSTATE CANCER: ICD-10-CM

## 2019-11-16 DIAGNOSIS — R11.2 NON-INTRACTABLE VOMITING WITH NAUSEA, UNSPECIFIED VOMITING TYPE: ICD-10-CM

## 2019-11-16 DIAGNOSIS — R14.0 DISTENDED ABDOMEN: Primary | ICD-10-CM

## 2019-11-16 DIAGNOSIS — R10.9 ABDOMINAL PAIN: ICD-10-CM

## 2019-11-16 DIAGNOSIS — F03.90 DEMENTIA WITHOUT BEHAVIORAL DISTURBANCE, UNSPECIFIED DEMENTIA TYPE: ICD-10-CM

## 2019-11-16 DIAGNOSIS — E03.4 HYPOTHYROIDISM DUE TO ACQUIRED ATROPHY OF THYROID: ICD-10-CM

## 2019-11-16 LAB
ALBUMIN SERPL BCP-MCNC: 4 G/DL (ref 3.5–5.2)
ALP SERPL-CCNC: 52 U/L (ref 55–135)
ALT SERPL W/O P-5'-P-CCNC: 16 U/L (ref 10–44)
ANION GAP SERPL CALC-SCNC: 9 MMOL/L (ref 8–16)
AST SERPL-CCNC: 22 U/L (ref 10–40)
BACTERIA #/AREA URNS AUTO: ABNORMAL /HPF
BASOPHILS # BLD AUTO: 0.05 K/UL (ref 0–0.2)
BASOPHILS NFR BLD: 0.6 % (ref 0–1.9)
BILIRUB SERPL-MCNC: 0.6 MG/DL (ref 0.1–1)
BILIRUB UR QL STRIP: NEGATIVE
BUN SERPL-MCNC: 19 MG/DL (ref 10–30)
BUN SERPL-MCNC: 19 MG/DL (ref 6–30)
CALCIUM SERPL-MCNC: 9.7 MG/DL (ref 8.7–10.5)
CHLORIDE SERPL-SCNC: 106 MMOL/L (ref 95–110)
CHLORIDE SERPL-SCNC: 108 MMOL/L (ref 95–110)
CLARITY UR REFRACT.AUTO: ABNORMAL
CO2 SERPL-SCNC: 22 MMOL/L (ref 23–29)
COLOR UR AUTO: YELLOW
CREAT SERPL-MCNC: 1.3 MG/DL (ref 0.5–1.4)
CREAT SERPL-MCNC: 1.4 MG/DL (ref 0.5–1.4)
DIFFERENTIAL METHOD: ABNORMAL
EOSINOPHIL # BLD AUTO: 0.1 K/UL (ref 0–0.5)
EOSINOPHIL NFR BLD: 0.8 % (ref 0–8)
ERYTHROCYTE [DISTWIDTH] IN BLOOD BY AUTOMATED COUNT: 13.7 % (ref 11.5–14.5)
EST. GFR  (AFRICAN AMERICAN): 50.4 ML/MIN/1.73 M^2
EST. GFR  (NON AFRICAN AMERICAN): 43.6 ML/MIN/1.73 M^2
GLUCOSE SERPL-MCNC: 123 MG/DL (ref 70–110)
GLUCOSE SERPL-MCNC: 127 MG/DL (ref 70–110)
GLUCOSE UR QL STRIP: NEGATIVE
HCT VFR BLD AUTO: 43.7 % (ref 40–54)
HCT VFR BLD CALC: 42 %PCV (ref 36–54)
HGB BLD-MCNC: 14.5 G/DL (ref 14–18)
HGB UR QL STRIP: ABNORMAL
IMM GRANULOCYTES # BLD AUTO: 0.06 K/UL (ref 0–0.04)
IMM GRANULOCYTES NFR BLD AUTO: 0.7 % (ref 0–0.5)
KETONES UR QL STRIP: NEGATIVE
LACTATE SERPL-SCNC: 1.7 MMOL/L (ref 0.5–2.2)
LEUKOCYTE ESTERASE UR QL STRIP: ABNORMAL
LIPASE SERPL-CCNC: 26 U/L (ref 4–60)
LYMPHOCYTES # BLD AUTO: 1.1 K/UL (ref 1–4.8)
LYMPHOCYTES NFR BLD: 12.1 % (ref 18–48)
MCH RBC QN AUTO: 31.4 PG (ref 27–31)
MCHC RBC AUTO-ENTMCNC: 33.2 G/DL (ref 32–36)
MCV RBC AUTO: 95 FL (ref 82–98)
MICROSCOPIC COMMENT: ABNORMAL
MONOCYTES # BLD AUTO: 0.5 K/UL (ref 0.3–1)
MONOCYTES NFR BLD: 6 % (ref 4–15)
NEUTROPHILS # BLD AUTO: 7.1 K/UL (ref 1.8–7.7)
NEUTROPHILS NFR BLD: 79.8 % (ref 38–73)
NITRITE UR QL STRIP: NEGATIVE
NRBC BLD-RTO: 0 /100 WBC
PH UR STRIP: 5 [PH] (ref 5–8)
PLATELET # BLD AUTO: 209 K/UL (ref 150–350)
PMV BLD AUTO: 10.4 FL (ref 9.2–12.9)
POC IONIZED CALCIUM: 1.25 MMOL/L (ref 1.06–1.42)
POC TCO2 (MEASURED): 24 MMOL/L (ref 23–29)
POTASSIUM BLD-SCNC: 4 MMOL/L (ref 3.5–5.1)
POTASSIUM SERPL-SCNC: 4 MMOL/L (ref 3.5–5.1)
PROT SERPL-MCNC: 7.2 G/DL (ref 6–8.4)
PROT UR QL STRIP: NEGATIVE
RBC # BLD AUTO: 4.62 M/UL (ref 4.6–6.2)
RBC #/AREA URNS AUTO: >100 /HPF (ref 0–4)
SAMPLE: ABNORMAL
SODIUM BLD-SCNC: 142 MMOL/L (ref 136–145)
SODIUM SERPL-SCNC: 139 MMOL/L (ref 136–145)
SP GR UR STRIP: 1.02 (ref 1–1.03)
SQUAMOUS #/AREA URNS AUTO: 1 /HPF
TROPONIN I SERPL DL<=0.01 NG/ML-MCNC: <0.006 NG/ML (ref 0–0.03)
URN SPEC COLLECT METH UR: ABNORMAL
WBC # BLD AUTO: 8.84 K/UL (ref 3.9–12.7)
WBC #/AREA URNS AUTO: 7 /HPF (ref 0–5)

## 2019-11-16 PROCEDURE — G0378 HOSPITAL OBSERVATION PER HR: HCPCS

## 2019-11-16 PROCEDURE — 25500020 PHARM REV CODE 255: Performed by: EMERGENCY MEDICINE

## 2019-11-16 PROCEDURE — 81001 URINALYSIS AUTO W/SCOPE: CPT

## 2019-11-16 PROCEDURE — 63600175 PHARM REV CODE 636 W HCPCS: Performed by: EMERGENCY MEDICINE

## 2019-11-16 PROCEDURE — 93010 EKG 12-LEAD: ICD-10-PCS | Mod: ,,, | Performed by: INTERNAL MEDICINE

## 2019-11-16 PROCEDURE — 83690 ASSAY OF LIPASE: CPT

## 2019-11-16 PROCEDURE — 99285 PR EMERGENCY DEPT VISIT,LEVEL V: ICD-10-PCS | Mod: ,,, | Performed by: EMERGENCY MEDICINE

## 2019-11-16 PROCEDURE — 93010 ELECTROCARDIOGRAM REPORT: CPT | Mod: ,,, | Performed by: INTERNAL MEDICINE

## 2019-11-16 PROCEDURE — 99220 PR INITIAL OBSERVATION CARE,LEVL III: ICD-10-PCS | Mod: ,,, | Performed by: PHYSICIAN ASSISTANT

## 2019-11-16 PROCEDURE — 84484 ASSAY OF TROPONIN QUANT: CPT

## 2019-11-16 PROCEDURE — 99220 PR INITIAL OBSERVATION CARE,LEVL III: CPT | Mod: ,,, | Performed by: PHYSICIAN ASSISTANT

## 2019-11-16 PROCEDURE — 96375 TX/PRO/DX INJ NEW DRUG ADDON: CPT

## 2019-11-16 PROCEDURE — 85025 COMPLETE CBC W/AUTO DIFF WBC: CPT

## 2019-11-16 PROCEDURE — 96374 THER/PROPH/DIAG INJ IV PUSH: CPT | Mod: 59

## 2019-11-16 PROCEDURE — 99285 EMERGENCY DEPT VISIT HI MDM: CPT | Mod: ,,, | Performed by: EMERGENCY MEDICINE

## 2019-11-16 PROCEDURE — 99285 EMERGENCY DEPT VISIT HI MDM: CPT | Mod: 25

## 2019-11-16 PROCEDURE — 87086 URINE CULTURE/COLONY COUNT: CPT

## 2019-11-16 PROCEDURE — 96361 HYDRATE IV INFUSION ADD-ON: CPT

## 2019-11-16 PROCEDURE — 93005 ELECTROCARDIOGRAM TRACING: CPT

## 2019-11-16 PROCEDURE — 83605 ASSAY OF LACTIC ACID: CPT

## 2019-11-16 PROCEDURE — 80053 COMPREHEN METABOLIC PANEL: CPT

## 2019-11-16 RX ORDER — TAMSULOSIN HYDROCHLORIDE 0.4 MG/1
0.4 CAPSULE ORAL DAILY
Status: DISCONTINUED | OUTPATIENT
Start: 2019-11-16 | End: 2019-11-16 | Stop reason: HOSPADM

## 2019-11-16 RX ORDER — HYDRALAZINE HYDROCHLORIDE 25 MG/1
50 TABLET, FILM COATED ORAL EVERY 6 HOURS PRN
Status: DISCONTINUED | OUTPATIENT
Start: 2019-11-16 | End: 2019-11-16 | Stop reason: HOSPADM

## 2019-11-16 RX ORDER — ONDANSETRON 2 MG/ML
4 INJECTION INTRAMUSCULAR; INTRAVENOUS
Status: COMPLETED | OUTPATIENT
Start: 2019-11-16 | End: 2019-11-16

## 2019-11-16 RX ORDER — IPRATROPIUM BROMIDE AND ALBUTEROL SULFATE 2.5; .5 MG/3ML; MG/3ML
3 SOLUTION RESPIRATORY (INHALATION) EVERY 4 HOURS PRN
Status: DISCONTINUED | OUTPATIENT
Start: 2019-11-16 | End: 2019-11-16 | Stop reason: HOSPADM

## 2019-11-16 RX ORDER — LEVOTHYROXINE SODIUM 100 UG/1
100 TABLET ORAL DAILY
Status: DISCONTINUED | OUTPATIENT
Start: 2019-11-16 | End: 2019-11-16 | Stop reason: HOSPADM

## 2019-11-16 RX ORDER — GLUCAGON 1 MG
1 KIT INJECTION
Status: DISCONTINUED | OUTPATIENT
Start: 2019-11-16 | End: 2019-11-16 | Stop reason: HOSPADM

## 2019-11-16 RX ORDER — CEFTRIAXONE 1 G/1
1 INJECTION, POWDER, FOR SOLUTION INTRAMUSCULAR; INTRAVENOUS
Status: COMPLETED | OUTPATIENT
Start: 2019-11-16 | End: 2019-11-16

## 2019-11-16 RX ORDER — SODIUM CHLORIDE 0.9 % (FLUSH) 0.9 %
5 SYRINGE (ML) INJECTION
Status: DISCONTINUED | OUTPATIENT
Start: 2019-11-16 | End: 2019-11-16 | Stop reason: HOSPADM

## 2019-11-16 RX ORDER — PROCHLORPERAZINE EDISYLATE 5 MG/ML
5 INJECTION INTRAMUSCULAR; INTRAVENOUS EVERY 6 HOURS PRN
Status: DISCONTINUED | OUTPATIENT
Start: 2019-11-16 | End: 2019-11-16 | Stop reason: HOSPADM

## 2019-11-16 RX ORDER — MORPHINE SULFATE 4 MG/ML
4 INJECTION, SOLUTION INTRAMUSCULAR; INTRAVENOUS
Status: COMPLETED | OUTPATIENT
Start: 2019-11-16 | End: 2019-11-16

## 2019-11-16 RX ORDER — ACETAMINOPHEN 325 MG/1
650 TABLET ORAL EVERY 4 HOURS PRN
Status: DISCONTINUED | OUTPATIENT
Start: 2019-11-16 | End: 2019-11-16 | Stop reason: HOSPADM

## 2019-11-16 RX ORDER — MORPHINE SULFATE ORAL SOLUTION 10 MG/5ML
5 SOLUTION ORAL EVERY 6 HOURS PRN
Status: DISCONTINUED | OUTPATIENT
Start: 2019-11-16 | End: 2019-11-16 | Stop reason: HOSPADM

## 2019-11-16 RX ORDER — CYANOCOBALAMIN (VITAMIN B-12) 250 MCG
500 TABLET ORAL DAILY
Status: DISCONTINUED | OUTPATIENT
Start: 2019-11-16 | End: 2019-11-16 | Stop reason: HOSPADM

## 2019-11-16 RX ORDER — AMOXICILLIN 250 MG
1 CAPSULE ORAL 2 TIMES DAILY
Status: DISCONTINUED | OUTPATIENT
Start: 2019-11-16 | End: 2019-11-16 | Stop reason: HOSPADM

## 2019-11-16 RX ORDER — UBIDECARENONE 75 MG
500 CAPSULE ORAL DAILY
COMMUNITY

## 2019-11-16 RX ORDER — LEVOTHYROXINE SODIUM 100 UG/1
100 TABLET ORAL DAILY
COMMUNITY

## 2019-11-16 RX ORDER — RIVASTIGMINE TARTRATE 3 MG/1
3 CAPSULE ORAL 2 TIMES DAILY
Status: DISCONTINUED | OUTPATIENT
Start: 2019-11-16 | End: 2019-11-16 | Stop reason: HOSPADM

## 2019-11-16 RX ORDER — CEFTRIAXONE 1 G/1
1 INJECTION, POWDER, FOR SOLUTION INTRAMUSCULAR; INTRAVENOUS
Status: DISCONTINUED | OUTPATIENT
Start: 2019-11-17 | End: 2019-11-16 | Stop reason: HOSPADM

## 2019-11-16 RX ORDER — TRAZODONE HYDROCHLORIDE 50 MG/1
50 TABLET ORAL NIGHTLY PRN
Status: DISCONTINUED | OUTPATIENT
Start: 2019-11-16 | End: 2019-11-16 | Stop reason: HOSPADM

## 2019-11-16 RX ORDER — TAMSULOSIN HYDROCHLORIDE 0.4 MG/1
0.4 CAPSULE ORAL DAILY
COMMUNITY

## 2019-11-16 RX ORDER — ASPIRIN 81 MG/1
81 TABLET ORAL DAILY
Status: DISCONTINUED | OUTPATIENT
Start: 2019-11-16 | End: 2019-11-16 | Stop reason: HOSPADM

## 2019-11-16 RX ORDER — TRAZODONE HYDROCHLORIDE 50 MG/1
50 TABLET ORAL NIGHTLY PRN
COMMUNITY

## 2019-11-16 RX ORDER — ASPIRIN 81 MG/1
81 TABLET ORAL DAILY
COMMUNITY

## 2019-11-16 RX ORDER — IBUPROFEN 200 MG
24 TABLET ORAL
Status: DISCONTINUED | OUTPATIENT
Start: 2019-11-16 | End: 2019-11-16 | Stop reason: HOSPADM

## 2019-11-16 RX ORDER — IBUPROFEN 200 MG
16 TABLET ORAL
Status: DISCONTINUED | OUTPATIENT
Start: 2019-11-16 | End: 2019-11-16 | Stop reason: HOSPADM

## 2019-11-16 RX ORDER — RIVASTIGMINE TARTRATE 3 MG/1
3 CAPSULE ORAL 2 TIMES DAILY
COMMUNITY

## 2019-11-16 RX ORDER — ONDANSETRON 2 MG/ML
4 INJECTION INTRAMUSCULAR; INTRAVENOUS EVERY 8 HOURS PRN
Status: DISCONTINUED | OUTPATIENT
Start: 2019-11-16 | End: 2019-11-16 | Stop reason: HOSPADM

## 2019-11-16 RX ORDER — TALC
6 POWDER (GRAM) TOPICAL NIGHTLY PRN
Status: DISCONTINUED | OUTPATIENT
Start: 2019-11-16 | End: 2019-11-16 | Stop reason: HOSPADM

## 2019-11-16 RX ADMIN — IOHEXOL 75 ML: 350 INJECTION, SOLUTION INTRAVENOUS at 02:11

## 2019-11-16 RX ADMIN — CEFTRIAXONE SODIUM 1 G: 1 INJECTION, POWDER, FOR SOLUTION INTRAMUSCULAR; INTRAVENOUS at 03:11

## 2019-11-16 RX ADMIN — SODIUM CHLORIDE 1000 ML: 0.9 INJECTION, SOLUTION INTRAVENOUS at 02:11

## 2019-11-16 RX ADMIN — MORPHINE SULFATE 4 MG: 4 INJECTION INTRAVENOUS at 02:11

## 2019-11-16 RX ADMIN — ONDANSETRON 4 MG: 2 INJECTION INTRAMUSCULAR; INTRAVENOUS at 02:11

## 2019-11-16 NOTE — ED PROVIDER NOTES
Encounter Date: 11/16/2019       History     Chief Complaint   Patient presents with    Abdominal Pain     LLQ abdominal pain that began tonight.  denies n/v/d.      HPI     89-year-old male with a history of prostate cancer in the past now presenting with acute onset left lower quadrant abdominal pain that is diffuse, radiating to both sides he abdomen, associated with 7/10 pain, worse with palpation and associated with abdominal distension.  Onset was sudden, began a few hours prior to arrival.  He denies any associated fevers, chills, nausea, vomiting, shortness of breath, diarrhea or constipation.  He has no previous history of surgeries.  Denies any hematochezia, melena, hemoptysis, back pain, neck pain, dysuria, esophageal pain, pleuritic pain or chest pain. No other aggravating or alleviating factors.    Review of patient's allergies indicates:   Allergen Reactions    Penicillins      Past Medical History:   Diagnosis Date    CVA (cerebral vascular accident)     Dementia     Hypothyroid     Prostate cancer      Past Surgical History:   Procedure Laterality Date    CYSTOSCOPY      TONSILLECTOMY       History reviewed. No pertinent family history.  Social History     Tobacco Use    Smoking status: Former Smoker     Packs/day: 1.00     Years: 15.00     Pack years: 15.00   Substance Use Topics    Alcohol use: Not on file    Drug use: Not on file     Review of Systems   Constitutional: Negative for chills, fatigue and fever.   HENT: Negative for facial swelling and tinnitus.    Eyes: Negative for photophobia and visual disturbance.   Respiratory: Negative for chest tightness, shortness of breath and wheezing.    Cardiovascular: Negative for chest pain, palpitations and leg swelling.   Gastrointestinal: Positive for abdominal distention and abdominal pain. Negative for blood in stool, constipation, diarrhea, nausea and vomiting.   Genitourinary: Negative for flank pain, frequency, hematuria and  testicular pain.   Musculoskeletal: Negative for arthralgias, back pain, myalgias, neck pain and neck stiffness.   Skin: Negative for color change and wound.   Neurological: Negative for tremors and weakness.   Psychiatric/Behavioral: Negative for behavioral problems, confusion and hallucinations.       Physical Exam     Initial Vitals [11/16/19 0109]   BP Pulse Resp Temp SpO2   (!) 206/85 61 20 97.6 °F (36.4 °C) 97 %      MAP       --         Physical Exam    Nursing note and vitals reviewed.    Constitutional: Well-developed. Well-nourished. Minimal emotional distress.  HENT: OP clear and moist.  NECK: Supple. No cervical LAD.  There is no JVD  CARDIAC: RRR. Normal S1/ S2. No murmurs, gallops or rubs. 2+ distal pulses.  PULM: Normal effort. Breath sounds clear - no wheezes, rales, rhonchi.  ABD: Soft, diffusely tender, distended, normal BS. Negative Gayle sign, no McBurney's point tenderness. No guarding, no rebound.  : No CVA tenderness.   RECTAL: deferred  MS: Full ROM. No edema. 2+ distal pulses, equal pulses bilaterally  NEURO: Alert. Moving all extremities purposefully.  A&O x3, cranial nerves 2-12 intact, no focal sensory motor deficits, Romberg negative, no ataxia  SKIN: Warm and dry. No rash or lesions.  PSYCH: Normal judgment. Normal affect.      ED Course   Procedures  Labs Reviewed   CBC W/ AUTO DIFFERENTIAL - Abnormal; Notable for the following components:       Result Value    Mean Corpuscular Hemoglobin 31.4 (*)     Immature Granulocytes 0.7 (*)     Immature Grans (Abs) 0.06 (*)     Gran% 79.8 (*)     Lymph% 12.1 (*)     All other components within normal limits   COMPREHENSIVE METABOLIC PANEL - Abnormal; Notable for the following components:    CO2 22 (*)     Glucose 123 (*)     Alkaline Phosphatase 52 (*)     eGFR if  50.4 (*)     eGFR if non  43.6 (*)     All other components within normal limits   URINALYSIS, REFLEX TO URINE CULTURE - Abnormal; Notable for the  following components:    Appearance, UA Hazy (*)     Occult Blood UA 3+ (*)     Leukocytes, UA 1+ (*)     All other components within normal limits    Narrative:     Preferred Collection Type->Urine, Clean Catch   URINALYSIS MICROSCOPIC - Abnormal; Notable for the following components:    RBC, UA >100 (*)     WBC, UA 7 (*)     Bacteria Few (*)     All other components within normal limits    Narrative:     Preferred Collection Type->Urine, Clean Catch   ISTAT PROCEDURE - Abnormal; Notable for the following components:    POC Glucose 127 (*)     All other components within normal limits   LIPASE   LACTIC ACID, PLASMA   TROPONIN I     EKG Readings: (Independently Interpreted)   Initial Reading: No STEMI. Previous EKG: Compared with most recent EKG Rhythm: Normal Sinus Rhythm. Heart Rate: 62.     ECG Results          EKG 12-lead (Final result)  Result time 11/16/19 09:06:29    Final result by Jc, Lab In [facility] (11/16/19 09:06:29)                 Narrative:    Test Reason : R10.9,    Vent. Rate : 062 BPM     Atrial Rate : 062 BPM     P-R Int : 162 ms          QRS Dur : 094 ms      QT Int : 414 ms       P-R-T Axes : 000 -07 063 degrees     QTc Int : 420 ms    Ectopic atrial rhythm  Abnormal ECG  No previous ECGs available  Confirmed by TREY DAS MD ([id number]) on 11/16/2019 9:06:22 AM    Referred By: [email or web address]   SELF           Confirmed By:TREY DAS MD                            Imaging Results           CT Abdomen Pelvis With Contrast (Final result)  Result time 11/16/19 04:41:03    Final result by Sigifredo Olsen MD (11/16/19 04:41:03)                 Impression:      Asymmetric left perinephric/periureteral fat stranding with mild distention of the left collecting system and suspected punctate stone within the urinary bladder.  Constellation of findings suggests recently passed stone into the urinary bladder from the left ureter.  Ascending urinary tract infection also a consideration.  Suggest  correlation with urinalysis.    Mild hepatomegaly with a nonspecific subcentimeter right hepatic lobe lesion, statistically a cyst but too small to definitively characterize.    Small lower esophageal diverticulum.    Prostatomegaly.    Aortic calcification.    This report was flagged in Epic as abnormal.    Electronically signed by resident: Damion Gallagher  Date:    11/16/2019  Time:    02:52    Electronically signed by: Sigifredo Olsen MD  Date:    11/16/2019  Time:    04:41             Narrative:    EXAMINATION:  CT ABDOMEN PELVIS WITH CONTRAST    CLINICAL HISTORY:  LLQ pain, suspect diverticulitis;    TECHNIQUE:  Low dose axial images, sagittal and coronal reformations were obtained from the lung bases to the pubic symphysis following the IV administration of 75 mL of Omnipaque 350 .  Oral contrast was not given.    COMPARISON:  Abdominal radiograph 11/16/2019.    FINDINGS:  Heart: Normal in size. No pericardial effusion.    Lung Bases: Lungs are symmetrically aerated without consolidation or pleural fluid.  Mild bibasilar subsegmental atelectasis.    Liver: Liver is mildly enlarged measuring 18.5 cm in the craniocaudal direction.  Subcentimeter hypoattenuation in the right hepatic lobe, nonspecific but likely a cyst.  No enhancing hepatic lesions.  Portal vein is patent.    Gallbladder: No calcified gallstones.    Bile Ducts: No evidence of dilated ducts.    Pancreas: No mass or peripancreatic fat stranding.    Spleen: Unremarkable.    Adrenals: Unremarkable.    Kidneys/ Ureters: Normal in size and location. Slight asymmetric delayed enhancement and excretion of contrast from the left kidney.  Right kidney mid aspect exophytic 2.0 cm cyst.  No enhancing masses.    Right kidney is without nephrolithiasis or hydronephrosis.    Left kidney has mild perinephric fat stranding and an extrarenal pelvis and the left collecting system is mildly more prominent than the right.  The left ureter is asymmetrically mildly  dilated throughout its course and demonstrate mild periureteral fat stranding in its distal portion.  No calcified ureteral stone is seen.    Bladder: Bladder is decompressed and not well evaluated.  There is a questionable punctate stone measuring roughly 1-2 mm identified within the inferior aspect of the urinary bladder (coronal image 45).    Reproductive organs: Prostate is prominent size measuring 5.6 cm.    GI Tract/Mesentery: There is a left-sided distal esophageal 1.0 cm diverticulum.  Stomach and small bowel are within normal limits.  There are several scattered colonic diverticula without evidence of diverticulitis.  Appendix is visualized and is within normal limits.  No obstruction or inflammatory process.    Peritoneal Space: No ascites. No free air.    Retroperitoneum:  No significant adenopathy.    Abdominal wall:  Unremarkable.    Vasculature: No aneurysm.  Scattered calcification in the abdominal aorta and major branch vessels.    Bones: Multilevel degenerative changes of the spine with several levels of disc space narrowing and vacuum phenomenon and facet arthropathy.  Several anterior bridging osteophytes.  No acute fracture or destructive process.                               X-Ray Abdomen Flat And Erect (Final result)  Result time 11/16/19 02:38:29    Final result by Sigifredo Olsen MD (11/16/19 02:38:29)                 Impression:      Nonobstructive bowel gas pattern.      Electronically signed by: Sigifredo Olsen MD  Date:    11/16/2019  Time:    02:38             Narrative:    EXAMINATION:  XR ABDOMEN FLAT AND ERECT    CLINICAL HISTORY:  Abdominal Pain;    TECHNIQUE:  Flat and erect frontal views of the abdomen were performed.    COMPARISON:  None.    FINDINGS:  Radiopaque foci overlie the left upper quadrant of the abdomen likely external to the patient (in the front short pocket).  Bowel gas pattern is nonobstructive.  No convincing evidence of pneumoperitoneum.  No large volume fecal  burden.  No pathologic calcifications.  Bones demonstrate degenerative changes and mild right convex scoliotic curvature of the lumbar spine.                                 Medical Decision Making:   History:   Old Medical Records: I decided to obtain old medical records.  Initial Assessment:   89-year-old male with a history of prostate cancer now presenting with acute onset abdominal pain.  Differential Diagnosis:   Differential diagnosis includes was not limited to:  Volvulus, obstruction, perforation, intra-abdominal abscess, diverticulitis, appendicitis, biliary pathology, pancreatitis, ACS, aortic pathology.  Independently Interpreted Test(s):   I have ordered and independently interpreted X-rays - see prior notes.  I have ordered and independently interpreted EKG Reading(s) - see prior notes  Clinical Tests:   Lab Tests: Ordered and Reviewed  Radiological Study: Ordered and Reviewed  Medical Tests: Ordered and Reviewed  ED Management:  IV line, labs, UA  IV morphine  IV Zofran  Two view abdominal x-ray  CT scan abdomen/pelvis  ECG  Re-evaluation    Emergent evaluation of a patient presenting with acute onset abdominal pain. He is currently afebrile without tachycardia, without hypotension and without hypoxemia.  Physical exam findings remarkable for distended abdomen, tympanic with diffuse pain. No peritoneal signs on my exam however acutely tender.  No evidence of inguinal hernia or strangulated hernia.  No testicular pain. Given abdominal pain, a broad workup was conducted.  ECG obtained with no signs of ischemia or STEMI on my read.  Two view abdominal x-ray obtained. IV line was placed, labs were drawn, UA obtained. IV morphine given for pain control and IV Zofran given for nausea.  UA positive for acute cystitis with hematuria.  UA with 1+ leukocytes, greater than 100 RBCs, 7 WBCs and bacteria.  Creatinine elevated.  Will treat with IV Rocephin and continue to monitor.  Unclear etiology of abdominal  pain, possibly secondary to UTI.  Currently belly pain has resolved.  Abdominal x-ray and CT without acute findings.  Discussed case with hospital medicine team patient is admitted for ongoing treatment and management.  Patient agreeable to admission plan.    Complexity:  High risk - level 5                               Clinical Impression:       ICD-10-CM ICD-9-CM   1. Distended abdomen R14.0 787.3   2. Abdominal pain R10.9 789.00   3. Non-intractable vomiting with nausea, unspecified vomiting type R11.2 787.01   4. Prostate cancer C61 185   5. Hypothyroidism due to acquired atrophy of thyroid E03.4 244.8     246.8   6. Dementia without behavioral disturbance, unspecified dementia type F03.90 294.20   7. Nephrolithiasis N20.0 592.0   8. Acute cystitis with hematuria N30.01 595.0         Disposition:   Disposition: Placed in Observation  Condition: Fair      Everett Mc DO  Dept of Emergency Medicine   [facility]  Spectralink: [phone]                 Everett Mc DO  11/18/19 0333

## 2019-11-16 NOTE — NURSING
Security has come to the unit and will be looking for him. I have called his daughter's phone and left a message and have called back several times but no answer.

## 2019-11-16 NOTE — ASSESSMENT & PLAN NOTE
- UA showed 1+ leukocytes, >100 RBCs, 7 WBCs, few bacteria.  - Creatinine mildly elevated to 1.4 from 1.1 on 2/18/2019, GFR down for 43.6 from 58.8.  - Culture pending.  - Patient started on Rocephin in ER.  Will continue.

## 2019-11-16 NOTE — H&P
[facility] Hospital Medicine  History & Physical    Patient Name: Chivo Jun Hawkins MD  MRN: [id number]  Admission Date: 11/16/2019  Attending Physician: Kevin Orozco MD   Primary Care Provider: Yousuf Bo MD    [facility] Medicine Team: [facility] [place] [facility] F Inez Murphy PA-C     Patient information was obtained from patient, relative(s), past medical records and ER records.     Subjective:     Principal Problem:Acute cystitis with hematuria    Chief Complaint:   Chief Complaint   Patient presents with    Abdominal Pain     LLQ abdominal pain that began tonight.  denies n/v/d.         HPI: Patient is a 89 year old gentleman with a h/o prostate cancer, CVA, dementia, hypothyroidism.  Patient is a poor historian, daughter at bedside assists with HPI.  He presents with acute onset LLQ abdominal pain.  Pain radiated across abdomen.  It has resolved with IV morphine.  He denies N/V, SOB, chest pain, changes in BMs, melena, BRBPR.  Daughter notes abdominal distension that she first noticed last week.  Patient resides at [facility].    Past Medical History:   Diagnosis Date    CVA (cerebral vascular accident)     Dementia     Hypothyroid     Prostate cancer        Past Surgical History:   Procedure Laterality Date    CYSTOSCOPY      TONSILLECTOMY         Review of patient's allergies indicates:   Allergen Reactions    Penicillins        No current facility-administered medications on file prior to encounter.      Current Outpatient Medications on File Prior to Encounter   Medication Sig    aspirin (ECOTRIN) 81 MG EC tablet Take 81 mg by mouth once daily.    cyanocobalamin 500 MCG tablet Take 500 mcg by mouth once daily.    levothyroxine (SYNTHROID) 100 MCG tablet Take 100 mcg by mouth once daily.    rivastigmine tartrate (EXELON) 3 MG capsule Take 3 mg by mouth 2 (two) times daily.    tamsulosin (FLOMAX) 0.4 mg Cap Take 0.4 mg by mouth once daily.    traZODone (DESYREL)  50 MG tablet Take 50 mg by mouth nightly as needed for Insomnia.     Family History     None        Tobacco Use    Smoking status: Former Smoker     Packs/day: 1.00     Years: 15.00     Pack years: 15.00   Substance and Sexual Activity    Alcohol use: Not on file    Drug use: Not on file    Sexual activity: Not on file     Review of Systems   Unable to perform ROS: Dementia     Objective:     Vital Signs (Most Recent):  Temp: 97.6 °F (36.4 °C) (11/16/19 0109)  Pulse: 81 (11/16/19 0406)  Resp: 19 (11/16/19 0406)  BP: (!) 180/78 (11/16/19 0231)  SpO2: 95 % (11/16/19 0406) Vital Signs (24h Range):  Temp:  [97.6 °F (36.4 °C)] 97.6 °F (36.4 °C)  Pulse:  [60-81] 81  Resp:  [14-21] 19  SpO2:  [93 %-97 %] 95 %  BP: (164-215)/(74-90) 180/78     Weight: 61.2 kg (135 lb)  Body mass index is 22.47 kg/m².    Physical Exam   Constitutional: He appears well-developed and well-nourished. No distress.   HENT:   Head: Normocephalic and atraumatic.   Eyes: Pupils are equal, round, and reactive to light.   Neck: Neck supple. Carotid bruit is not present. No thyromegaly present.   Cardiovascular: Normal rate and regular rhythm. Exam reveals no gallop.   No murmur heard.  Pulmonary/Chest: Effort normal and breath sounds normal. No respiratory distress. He has no wheezes.   Abdominal: Bowel sounds are normal. He exhibits distension. There is no splenomegaly or hepatomegaly. There is no tenderness.   Musculoskeletal: Normal range of motion. He exhibits no edema.   Neurological: He is alert. No cranial nerve deficit or sensory deficit. GCS eye subscore is 4. GCS verbal subscore is 5. GCS motor subscore is 6.   Skin: Skin is warm and dry. No rash noted.   Psychiatric: He has a normal mood and affect. His behavior is normal.         CRANIAL NERVES     CN III, IV, VI   Pupils are equal, round, and reactive to light.       Significant Labs:   CBC:   Recent Labs   Lab 11/16/19  0135 11/16/19  0143   WBC 8.84  --    HGB 14.5  --    HCT 43.7  42     --      CMP:   Recent Labs   Lab 11/16/19  0135      K 4.0      CO2 22*   *   BUN 19   CREATININE 1.4   CALCIUM 9.7   PROT 7.2   ALBUMIN 4.0   BILITOT 0.6   ALKPHOS 52*   AST 22   ALT 16   ANIONGAP 9   EGFRNONAA 43.6*     Lactic Acid:   Recent Labs   Lab 11/16/19  0135   LACTATE 1.7     Lipase:   Recent Labs   Lab 11/16/19  0135   LIPASE 26     Urine Studies:   Recent Labs   Lab 11/16/19  0213   COLORU Yellow   APPEARANCEUA Hazy*   PHUR 5.0   SPECGRAV 1.020   PROTEINUA Negative   GLUCUA Negative   KETONESU Negative   BILIRUBINUA Negative   OCCULTUA 3+*   NITRITE Negative   LEUKOCYTESUR 1+*   RBCUA >100*   WBCUA 7*   BACTERIA Few*   SQUAMEPITHEL 1       Significant Imaging: I have reviewed all pertinent imaging results/findings within the past 24 hours.    Assessment/Plan:     * Acute cystitis with hematuria  - UA showed 1+ leukocytes, >100 RBCs, 7 WBCs, few bacteria.  - Creatinine mildly elevated to 1.4 from 1.1 on 2/18/2019, GFR down for 43.6 from 58.8.  - Culture pending.  - Patient started on Rocephin in ER.  Will continue.      Abdominal pain  - Unclear etiology, possibly secondary to UTI?  Currently resolved.  - Abdominal x-ray:  Nonobstructive bowel gas pattern.  - CT abdomen/pelvis pending.  - Clear liquid diet, supportive care.      Dementia  - Continue Exelon 3mg BID.    DELIRIUM BEHAVIOR MANAGEMENT  - Minimize use of restraints; if physical restraints necessary, please utilize medical/chemical prns for agitation.  - Keep shades open and room lit during day and room dim at night in order to promote healthy circadian rhythms.  - Encourage family at bedside  - Keep whiteboard in patient's room current with the date and name of the members of patient's team for easy patient self re-orientation.  - Avoid benzodiazepines, antihistamines, anticholinergics, hypnotics, and minimize opiates while controlling for pain as these medications may exacerbate delirium.        Hypothyroid  -  Continue levothyroxine 100mcg daily.        VTE Risk Mitigation (From admission, onward)         Ordered     Place sequential compression device  Until discontinued      11/16/19 0423     Place CHANDA hose  Until discontinued      11/16/19 0423     IP VTE HIGH RISK PATIENT  Once      11/16/19 0423                   Inez Murphy PA-C  Department of Hospital Medicine   [facility]

## 2019-11-16 NOTE — NURSING
The charge nurse Ananya and I spoke to house supervisor Thony. He is aware of all we have done thus far. We did call the other children and left messages for them to please call nurse at [facility] as we had a question to ask. I then called his assisted living to see if he arrived and he answered the phone, I let him know I had been looking for him so his children may call him. He said ok, he did not know he had to let nurse know he was leaving. I told him ok and glad he feels better. His daughter Albania did call back, she was not aware her father had been in the hospital, I let her know I could not tell her anything but she could call her siblings. I asked her to please pass on that we had found that someone had picked up her Dad and he was at his assisted living.

## 2019-11-16 NOTE — HPI
Patient is a 89 year old gentleman with a h/o prostate cancer, CVA, dementia, hypothyroidism.  Patient is a poor historian, daughter at bedside assists with HPI.  He presents with acute onset LLQ abdominal pain.  Pain radiated across abdomen.  It has resolved with IV morphine.  He denies N/V, SOB, chest pain, changes in BMs, melena, BRBPR.  Daughter notes abdominal distension that she first noticed last week.  Patient resides at [facility].

## 2019-11-16 NOTE — ED NOTES
"Historian states, "lower abdominal pain. He called me about 1230. I think it was after dinner when it started though." pt states, "yeah, it was." pt reports generalized abdominal pain. No acute distress noted. Stable condition. Family member at bedside.    "

## 2019-11-16 NOTE — ASSESSMENT & PLAN NOTE
- Continue Exelon 3mg BID.    DELIRIUM BEHAVIOR MANAGEMENT  - Minimize use of restraints; if physical restraints necessary, please utilize medical/chemical prns for agitation.  - Keep shades open and room lit during day and room dim at night in order to promote healthy circadian rhythms.  - Encourage family at bedside  - Keep whiteboard in patient's room current with the date and name of the members of patient's team for easy patient self re-orientation.  - Avoid benzodiazepines, antihistamines, anticholinergics, hypnotics, and minimize opiates while controlling for pain as these medications may exacerbate delirium.

## 2019-11-16 NOTE — ASSESSMENT & PLAN NOTE
- Unclear etiology, possibly secondary to UTI?  Currently resolved.  - Abdominal x-ray:  Nonobstructive bowel gas pattern.  - CT abdomen/pelvis pending.  - Clear liquid diet, supportive care.

## 2019-11-16 NOTE — SUBJECTIVE & OBJECTIVE
Past Medical History:   Diagnosis Date    CVA (cerebral vascular accident)     Dementia     Hypothyroid     Prostate cancer        Past Surgical History:   Procedure Laterality Date    CYSTOSCOPY      TONSILLECTOMY         Review of patient's allergies indicates:   Allergen Reactions    Penicillins        No current facility-administered medications on file prior to encounter.      Current Outpatient Medications on File Prior to Encounter   Medication Sig    aspirin (ECOTRIN) 81 MG EC tablet Take 81 mg by mouth once daily.    cyanocobalamin 500 MCG tablet Take 500 mcg by mouth once daily.    levothyroxine (SYNTHROID) 100 MCG tablet Take 100 mcg by mouth once daily.    rivastigmine tartrate (EXELON) 3 MG capsule Take 3 mg by mouth 2 (two) times daily.    tamsulosin (FLOMAX) 0.4 mg Cap Take 0.4 mg by mouth once daily.    traZODone (DESYREL) 50 MG tablet Take 50 mg by mouth nightly as needed for Insomnia.     Family History     None        Tobacco Use    Smoking status: Former Smoker     Packs/day: 1.00     Years: 15.00     Pack years: 15.00   Substance and Sexual Activity    Alcohol use: Not on file    Drug use: Not on file    Sexual activity: Not on file     Review of Systems   Unable to perform ROS: Dementia     Objective:     Vital Signs (Most Recent):  Temp: 97.6 °F (36.4 °C) (11/16/19 0109)  Pulse: 81 (11/16/19 0406)  Resp: 19 (11/16/19 0406)  BP: (!) 180/78 (11/16/19 0231)  SpO2: 95 % (11/16/19 0406) Vital Signs (24h Range):  Temp:  [97.6 °F (36.4 °C)] 97.6 °F (36.4 °C)  Pulse:  [60-81] 81  Resp:  [14-21] 19  SpO2:  [93 %-97 %] 95 %  BP: (164-215)/(74-90) 180/78     Weight: 61.2 kg (135 lb)  Body mass index is 22.47 kg/m².    Physical Exam   Constitutional: He appears well-developed and well-nourished. No distress.   HENT:   Head: Normocephalic and atraumatic.   Eyes: Pupils are equal, round, and reactive to light.   Neck: Neck supple. Carotid bruit is not present. No thyromegaly present.    Cardiovascular: Normal rate and regular rhythm. Exam reveals no gallop.   No murmur heard.  Pulmonary/Chest: Effort normal and breath sounds normal. No respiratory distress. He has no wheezes.   Abdominal: Bowel sounds are normal. He exhibits no distension. There is no splenomegaly or hepatomegaly. There is no tenderness.   Musculoskeletal: Normal range of motion. He exhibits no edema.   Neurological: He is alert. No cranial nerve deficit or sensory deficit. GCS eye subscore is 4. GCS verbal subscore is 5. GCS motor subscore is 6.   Skin: Skin is warm and dry. No rash noted.   Psychiatric: He has a normal mood and affect. His behavior is normal.         CRANIAL NERVES     CN III, IV, VI   Pupils are equal, round, and reactive to light.       Significant Labs:   CBC:   Recent Labs   Lab 11/16/19 0135 11/16/19 0143   WBC 8.84  --    HGB 14.5  --    HCT 43.7 42     --      CMP:   Recent Labs   Lab 11/16/19 0135      K 4.0      CO2 22*   *   BUN 19   CREATININE 1.4   CALCIUM 9.7   PROT 7.2   ALBUMIN 4.0   BILITOT 0.6   ALKPHOS 52*   AST 22   ALT 16   ANIONGAP 9   EGFRNONAA 43.6*     Lactic Acid:   Recent Labs   Lab 11/16/19 0135   LACTATE 1.7     Lipase:   Recent Labs   Lab 11/16/19 0135   LIPASE 26     Urine Studies:   Recent Labs   Lab 11/16/19  0213   COLORU Yellow   APPEARANCEUA Hazy*   PHUR 5.0   SPECGRAV 1.020   PROTEINUA Negative   GLUCUA Negative   KETONESU Negative   BILIRUBINUA Negative   OCCULTUA 3+*   NITRITE Negative   LEUKOCYTESUR 1+*   RBCUA >100*   WBCUA 7*   BACTERIA Few*   SQUAMEPITHEL 1       Significant Imaging: I have reviewed all pertinent imaging results/findings within the past 24 hours.

## 2019-11-16 NOTE — NURSING
Mr. Hawkins was not in his room at 1050. He had been sitting in the recliner, but the bathroom curtain is now pulled and I thought he was using the toilet but after about 10 minutes I called in to ask if he was ok and found no one was in the room. I did walk our unit, the unit next door and the lobby by the elevators and asked other staff to be on the look out- no one had seen him. I notified the PA for his medical team, and charge nurse Ananya. I called his daughter Maria Del Carmen at [phone] and no one answered the phone. I called security and they are on their way up to our unit.

## 2019-11-17 LAB — BACTERIA UR CULT: NO GROWTH

## 2019-11-18 PROBLEM — R31.9 HEMATURIA: Status: ACTIVE | Noted: 2019-11-16

## 2019-11-18 NOTE — DISCHARGE SUMMARY
[facility] Hospital Medicine  Discharge Summary      Patient Name: Chivo Hawkins MD  MRN: [id number]  Admission Date: 11/16/2019  Hospital Length of Stay: 0 days  Discharge Date and Time: 11/16/2019 10:40 AM  Attending Physician: No att. providers found   Discharging Provider: Zev Farias PA-C  Primary Care Provider: Yousuf Bo MD  [facility] Medicine Team: [facility] [place] HOSP MED F Zev Farias PA-C    HPI:   Patient is a 89 year old gentleman with a h/o prostate cancer, CVA, dementia, hypothyroidism.  Patient is a poor historian, daughter at bedside assists with HPI.  He presents with acute onset LLQ abdominal pain.  Pain radiated across abdomen.  It has resolved with IV morphine.  He denies N/V, SOB, chest pain, changes in BMs, melena, BRBPR.  Daughter notes abdominal distension that she first noticed last week.  Patient [place] at [place].    * No surgery found *      Hospital Course:   Patient admitted to observation for abdominal pain. UA showed hematuria. CT abdomen showed small kidney stone that had passed. His pain resolved. UA with 7WBCs, will not treat. Patient stable for discharge.     Consults:     * Hematuria  - UA showed 1+ leukocytes, >100 RBCs, 7 WBCs, few bacteria.  - Creatinine mildly elevated to 1.4 from 1.1 on 2/18/2019, GFR down for 43.6 from 58.8.  - urine culture with no growth  - CT abdomen showed recent stone passing  - pain resolved  - stable for discharge    Dementia  - Continue Exelon 3mg BID.    DELIRIUM BEHAVIOR MANAGEMENT  - Minimize use of restraints; if physical restraints necessary, please utilize medical/chemical prns for agitation.  - Keep shades open and room lit during day and room dim at night in order to promote healthy circadian rhythms.  - Encourage family at bedside  - Keep whiteboard in patient's room current with the date and name of the members of patient's team for easy patient self re-orientation.  - Avoid benzodiazepines,  antihistamines, anticholinergics, hypnotics, and minimize opiates while controlling for pain as these medications may exacerbate delirium.    Hypothyroid  - Continue levothyroxine 100mcg daily.    Abdominal pain  - see hematuria      Final Active Diagnoses:    Diagnosis Date Noted POA    PRINCIPAL PROBLEM:  Hematuria [R31.9] 11/16/2019 Yes    Abdominal pain [R10.9] 11/16/2019 Yes    Dementia [F03.90]  Yes     Chronic    Hypothyroid [E03.9]  Yes     Chronic      Problems Resolved During this Admission:       Discharged Condition: good    Disposition: Home or Self Care    Follow Up:  Follow-up Information     Yousuf Bo MD.    Specialty:  Hematology and Oncology  Why:  If symptoms worsen  Contact information:  [address] [facility] [address]  [phone]                 Patient Instructions:      Diet Cardiac     Notify your health care provider if you experience any of the following:  severe uncontrolled pain     Notify your health care provider if you experience any of the following:  persistent nausea and vomiting or diarrhea     Notify your health care provider if you experience any of the following:  increased confusion or weakness     Notify your health care provider if you experience any of the following:  persistent dizziness, light-headedness, or visual disturbances     Activity as tolerated       Significant Diagnostic Studies: Labs: All labs within the past 24 hours have been reviewed    Pending Diagnostic Studies:     None         Medications:  Reconciled Home Medications:      Medication List      CONTINUE taking these medications    aspirin 81 MG EC tablet  Commonly known as:  ECOTRIN  Take 81 mg by mouth once daily.     cyanocobalamin 500 MCG tablet  Take 500 mcg by mouth once daily.     Flomax 0.4 mg Cap  Generic drug:  tamsulosin  Take 0.4 mg by mouth once daily.     levothyroxine 100 MCG tablet  Commonly known as:  SYNTHROID  Take 100 mcg by mouth once daily.     rivastigmine  tartrate 3 MG capsule  Commonly known as:  EXELON  Take 3 mg by mouth 2 (two) times daily.     traZODone 50 MG tablet  Commonly known as:  DESYREL  Take 50 mg by mouth nightly as needed for Insomnia.            Indwelling Lines/Drains at time of discharge:   Lines/Drains/Airways     Drain                 Urethral Catheter 03/07/19 1220 Double-lumen 20 Fr. 255 days                Time spent on the discharge of patient: 32 minutes  Patient was seen and examined on the date of discharge and determined to be suitable for discharge.         Zev Farias PA-C  Department of Hospital Medicine  [facility]

## 2019-11-18 NOTE — HOSPITAL COURSE
Patient admitted to observation for abdominal pain. UA showed hematuria. CT abdomen showed small kidney stone that had passed. His pain resolved. UA with 7WBCs, will not treat. Patient stable for discharge.

## 2019-11-18 NOTE — ASSESSMENT & PLAN NOTE
- UA showed 1+ leukocytes, >100 RBCs, 7 WBCs, few bacteria.  - Creatinine mildly elevated to 1.4 from 1.1 on 2/18/2019, GFR down for 43.6 from 58.8.  - urine culture with no growth  - CT abdomen showed recent stone passing  - pain resolved  - stable for discharge

## 2020-01-29 ENCOUNTER — TELEPHONE (OUTPATIENT)
Dept: INFECTIOUS DISEASES | Facility: CLINIC | Age: 85
End: 2020-01-29

## 2020-01-29 NOTE — TELEPHONE ENCOUNTER
Spoke to Hanny boyer/Fede broderick   (ph# [phone] , fx# [phone])    Patient is going to be starting a medication program and needs an updated:    * List of medications currently taking  * dosage  * times when supposed to take   * your signature    Please advise

## 2020-01-29 NOTE — TELEPHONE ENCOUNTER
----- Message from Aubree Olson sent at 1/29/2020  2:16 PM CST -----  Contact: Hanny lang [facility]  [phone] fax [phone]  Needs a list of all medication with given times and  for pt please call back

## 2023-09-02 NOTE — TELEPHONE ENCOUNTER
----- Message from Cristel Hazel sent at 5/17/2018 10:55 AM CDT -----  Name of Who is Calling: [place] (Daughter)      What is the request in detail: Albania would like to have a conversation about the pts progress. She states she really concerned about the pt.      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 The surgical margins are positive for tumor cells.

## (undated) DEVICE — GOWN SMARTGOWN LVL4 X-LONG XL

## (undated) DEVICE — TRAY CYSTO BASIN

## (undated) DEVICE — ELECTRODE REM PLYHSV RETURN 9

## (undated) DEVICE — PACK CYSTO

## (undated) DEVICE — SET IRR URLGY 2LINE UNIV SPIKE

## (undated) DEVICE — SOL IRR WATER STRL 3000 ML